# Patient Record
Sex: MALE | Race: WHITE | NOT HISPANIC OR LATINO | Employment: FULL TIME | URBAN - METROPOLITAN AREA
[De-identification: names, ages, dates, MRNs, and addresses within clinical notes are randomized per-mention and may not be internally consistent; named-entity substitution may affect disease eponyms.]

---

## 2017-01-05 ENCOUNTER — PATIENT OUTREACH (OUTPATIENT)
Dept: ADMINISTRATIVE | Facility: HOSPITAL | Age: 52
End: 2017-01-05

## 2017-01-19 ENCOUNTER — OFFICE VISIT (OUTPATIENT)
Dept: FAMILY MEDICINE | Facility: CLINIC | Age: 52
End: 2017-01-19
Payer: COMMERCIAL

## 2017-01-19 VITALS
RESPIRATION RATE: 17 BRPM | OXYGEN SATURATION: 94 % | BODY MASS INDEX: 38.09 KG/M2 | HEIGHT: 66 IN | SYSTOLIC BLOOD PRESSURE: 110 MMHG | HEART RATE: 84 BPM | WEIGHT: 237 LBS | DIASTOLIC BLOOD PRESSURE: 78 MMHG

## 2017-01-19 DIAGNOSIS — L21.9 SEBORRHEIC DERMATITIS: ICD-10-CM

## 2017-01-19 DIAGNOSIS — I10 ESSENTIAL HYPERTENSION: Primary | ICD-10-CM

## 2017-01-19 PROCEDURE — 3078F DIAST BP <80 MM HG: CPT | Mod: S$GLB,,, | Performed by: EMERGENCY MEDICINE

## 2017-01-19 PROCEDURE — 99999 PR PBB SHADOW E&M-EST. PATIENT-LVL III: CPT | Mod: PBBFAC,,, | Performed by: EMERGENCY MEDICINE

## 2017-01-19 PROCEDURE — 3074F SYST BP LT 130 MM HG: CPT | Mod: S$GLB,,, | Performed by: EMERGENCY MEDICINE

## 2017-01-19 PROCEDURE — 99213 OFFICE O/P EST LOW 20 MIN: CPT | Mod: S$GLB,,, | Performed by: EMERGENCY MEDICINE

## 2017-01-19 PROCEDURE — 1159F MED LIST DOCD IN RCRD: CPT | Mod: S$GLB,,, | Performed by: EMERGENCY MEDICINE

## 2017-01-19 NOTE — PROGRESS NOTES
"Subjective:   THIS NOTE IS DONE WITH VOICE RECOGNITION        Patient ID: Warren Garcia is a 51 y.o. male.    Chief Complaint: Hypertension      HPI     Warren continues to do quite well with his change in lifestyle, increased exercise, better diet, and reduction in his blood pressure medications.    Vitals - 1 value per visit 2/12/2016 4/18/2016 9/20/2016 1/19/2017   SYSTOLIC 132  120 110   DIASTOLIC 87  70 78   PULSE 85  79 84   TEMPERATURE       RESPIRATIONS   12 17   SPO2   97 94   Weight (lb) 275 273.37 245.15 236.99   HEIGHT 5' 6" 5' 6" 5' 6" 5' 6"   BODY MASS INDEX 44.41 44.14 39.57 38.25   VISIT REPORT       Pain Score  0  0 0     He is not experiencing any new issues.  The rate of weight loss has declined, which is expected.  He is reassured that if he continues his current behaviors, I would expect more weight to be lost.    No change in social history, surgical history, medical history, or family history.    Immunization History   Administered Date(s) Administered    Influenza Split 10/26/2010    Tdap 03/06/2015    influenza - Quadrivalent 01/22/2016       Current Outpatient Prescriptions   Medication Sig Dispense Refill    blood sugar diagnostic Strp 1 strip by Misc.(Non-Drug; Combo Route) route once daily. 100 strip 4    gemfibrozil (LOPID) 600 MG tablet TAKE ONE TABLET BY MOUTH TWICE DAILY 60 tablet 11    hydrochlorothiazide (HYDRODIURIL) 12.5 MG Tab TAKE ONE TABLET BY MOUTH ONCE DAILY 90 tablet 3    lancets (ONETOUCH DELICA LANCETS) 33 gauge Misc 1 lancet by Misc.(Non-Drug; Combo Route) route once daily. 100 each 3    lisinopril (PRINIVIL,ZESTRIL) 20 MG tablet TAKE ONE TABLET BY MOUTH ONCE DAILY 30 tablet 11    naproxen (NAPROSYN) 500 MG tablet Take 1 tablet (500 mg total) by mouth 2 (two) times daily with meals. 60 tablet 0     No current facility-administered medications for this visit.      Patient entered via patient portal and augmented by myself.      Review of Systems "   Constitutional: Negative for fatigue.   Eyes: Negative for visual disturbance (.).   Cardiovascular: Negative for chest pain.   Endocrine: Negative for polydipsia, polyphagia and polyuria.   Skin: Negative for pallor.   Neurological: Negative for dizziness, tremors, seizures, speech difficulty, weakness and headaches.   Psychiatric/Behavioral: Negative for confusion. The patient is not nervous/anxious.        Objective:      Physical Exam   Constitutional: He is oriented to person, place, and time. He appears well-nourished.   His physique continues to improve.   HENT:   Head: Normocephalic and atraumatic.   Eyes: Conjunctivae are normal. No scleral icterus.   Neck: Normal range of motion. Neck supple.   Cardiovascular: Normal rate, regular rhythm and normal heart sounds.    Pulmonary/Chest: Effort normal. He has no wheezes. He has no rales.   Abdominal: He exhibits no distension. There is no tenderness.   Musculoskeletal: He exhibits no edema.   Neurological: He is alert and oriented to person, place, and time.   Skin:   Residual seborrheic dermatitis in nasal labial folds, eyebrows, and around the ears.   Nursing note and vitals reviewed.      Assessment:       1. Essential hypertension    2. Seborrheic dermatitis        Plan:     1. Essential hypertension  Has been responsive to weight loss, he's encouraged to continue  Limit salt  Check labs at his convenience in the next couple of months.  Blood pressure target of less than 120 established, this being systolic.    - Lipid panel; Future  - Comprehensive metabolic panel; Future    2. Seborrheic dermatitis  Mild-to-moderate  We'll continue to try as needed therapy with over-the-counter hydrocortisone cream.  If this does not work, certainly we can add a prescription steroid cream.  Ideally nonfloranated.

## 2017-01-19 NOTE — MR AVS SNAPSHOT
[facility]  [address] [facility] [address]  [place] [address]  Phone: [phone]  Fax: [phone]                  Warren Quick [place]   2017 10:40 AM   Office Visit    Description:  Male : 1965   Provider:  Warren Simeon Jr., MD   Department:  [facility]           Reason for Visit     Hypertension           Diagnoses this Visit        Comments    Essential hypertension    -  Primary            To Do List           Goals (5 Years of Data)              Today    9/20/16    3/30/16    Blood Pressure < 140/90   110/78  120/70      LDL CHOLESTEROL < 130       89.2      [facility] On Call     [facility] On Call Nurse Care Line -  Assistance  Registered nurses in the [facility] On Call Center provide clinical advisement, health education, appointment booking, and other advisory services.  Call for this free service at [phone].             Medications           Message regarding Medications     Verify the changes and/or additions to your medication regime listed below are the same as discussed with your clinician today.  If any of these changes or additions are incorrect, please notify your healthcare provider.        STOP taking these medications     lisinopril (PRINIVIL,ZESTRIL) 20 MG tablet TAKE ONE TABLET BY MOUTH ONCE DAILY    gemfibrozil (LOPID) 600 MG tablet TAKE ONE TABLET BY MOUTH TWICE DAILY           Verify that the below list of medications is an accurate representation of the medications you are currently taking.  If none reported, the list may be blank. If incorrect, please contact your healthcare provider. Carry this list with you in case of emergency.           Current Medications     blood sugar diagnostic Strp 1 strip by Misc.(Non-Drug; Combo Route) route once daily.    hydrochlorothiazide (HYDRODIURIL) 12.5 MG Tab TAKE ONE TABLET BY MOUTH ONCE DAILY    lancets (ONETOUCH DELICA LANCETS) 33 gauge Misc 1 lancet by Misc.(Non-Drug; Combo Route) route once  "daily.    naproxen (NAPROSYN) 500 MG tablet Take 1 tablet (500 mg total) by mouth 2 (two) times daily with meals.           Clinical Reference Information           Vital Signs - Last Recorded  Most recent update: 1/19/2017 11:35 AM by Mony Quinn LPN    BP Pulse Resp Ht Wt SpO2    110/78 (BP Location: Right arm, Patient Position: Sitting, BP Method: Manual) 84 17 5' 6" (1.676 m) 107.5 kg (236 lb 15.9 oz) (!) 94%    BMI                38.25 kg/m2          Blood Pressure          Most Recent Value    BP  110/78      Allergies as of 1/19/2017     No Known Drug Allergies      Immunizations Administered on Date of Encounter - 1/19/2017     None      Orders Placed During Today's Visit     Future Labs/Procedures Expected by Expires    Comprehensive metabolic panel  1/19/2017 3/20/2018    Lipid panel  1/19/2017 3/20/2018      "

## 2017-01-20 DIAGNOSIS — E11.9 TYPE 2 DIABETES MELLITUS WITHOUT COMPLICATION: ICD-10-CM

## 2017-01-22 NOTE — PATIENT INSTRUCTIONS
Warren,    You're doing very well.  I would encourage you to continue the exercise and diet.    The seborrheic dermatitis is a chronic condition.  He manages with gentle soap and water, over-the-counter hydrocortisone cream (chordee).  If this fails, low-dose steroid cream can be prescribed.    We do need to update your labs at your convenience.  We can do this as we discussed at your visit.    LAURIE

## 2017-01-26 ENCOUNTER — PATIENT OUTREACH (OUTPATIENT)
Dept: ADMINISTRATIVE | Facility: HOSPITAL | Age: 52
End: 2017-01-26

## 2017-02-21 ENCOUNTER — PATIENT OUTREACH (OUTPATIENT)
Dept: ADMINISTRATIVE | Facility: HOSPITAL | Age: 52
End: 2017-02-21

## 2017-03-10 ENCOUNTER — PATIENT MESSAGE (OUTPATIENT)
Dept: FAMILY MEDICINE | Facility: CLINIC | Age: 52
End: 2017-03-10

## 2017-03-10 ENCOUNTER — TELEPHONE (OUTPATIENT)
Dept: FAMILY MEDICINE | Facility: CLINIC | Age: 52
End: 2017-03-10

## 2017-03-10 NOTE — TELEPHONE ENCOUNTER
----- Message from Gemini Roberts sent at 3/10/2017  9:09 AM CST -----  Contact: wife, heather   Stating he was in [place], heart attack   See in [place].   Instructed to see PCP within 2 weeks  Call back

## 2017-03-13 NOTE — TELEPHONE ENCOUNTER
See mychart message. Patient has referral for cardiology pended. Was at [facility] with heart attack. Needs cardiologist locally. He has an appointment with Dr Abreu on Tuesday 3/14/17. Patient also has an appointment with Dr Simeon on Friday 3/17/17. Please advise.

## 2017-03-14 ENCOUNTER — OFFICE VISIT (OUTPATIENT)
Dept: CARDIOLOGY | Facility: CLINIC | Age: 52
End: 2017-03-14
Payer: COMMERCIAL

## 2017-03-14 VITALS
DIASTOLIC BLOOD PRESSURE: 79 MMHG | BODY MASS INDEX: 38.09 KG/M2 | SYSTOLIC BLOOD PRESSURE: 112 MMHG | WEIGHT: 237 LBS | HEIGHT: 66 IN | HEART RATE: 69 BPM

## 2017-03-14 DIAGNOSIS — I10 ESSENTIAL HYPERTENSION: Primary | Chronic | ICD-10-CM

## 2017-03-14 DIAGNOSIS — I25.10 CORONARY ARTERY DISEASE INVOLVING NATIVE CORONARY ARTERY OF NATIVE HEART, ANGINA PRESENCE UNSPECIFIED: Primary | ICD-10-CM

## 2017-03-14 DIAGNOSIS — E78.1 HYPERTRIGLYCERIDEMIA: Chronic | ICD-10-CM

## 2017-03-14 DIAGNOSIS — I25.10 CORONARY ARTERY DISEASE INVOLVING NATIVE CORONARY ARTERY OF NATIVE HEART WITHOUT ANGINA PECTORIS: ICD-10-CM

## 2017-03-14 PROCEDURE — 1160F RVW MEDS BY RX/DR IN RCRD: CPT | Mod: S$GLB,,, | Performed by: INTERNAL MEDICINE

## 2017-03-14 PROCEDURE — 3074F SYST BP LT 130 MM HG: CPT | Mod: S$GLB,,, | Performed by: INTERNAL MEDICINE

## 2017-03-14 PROCEDURE — 99999 PR PBB SHADOW E&M-EST. PATIENT-LVL III: CPT | Mod: PBBFAC,,, | Performed by: INTERNAL MEDICINE

## 2017-03-14 PROCEDURE — 3078F DIAST BP <80 MM HG: CPT | Mod: S$GLB,,, | Performed by: INTERNAL MEDICINE

## 2017-03-14 PROCEDURE — 99204 OFFICE O/P NEW MOD 45 MIN: CPT | Mod: S$GLB,,, | Performed by: INTERNAL MEDICINE

## 2017-03-14 RX ORDER — ATORVASTATIN CALCIUM 80 MG/1
TABLET, FILM COATED ORAL
COMMUNITY
Start: 2017-03-09 | End: 2018-02-07 | Stop reason: SDUPTHER

## 2017-03-14 RX ORDER — METOPROLOL TARTRATE 25 MG/1
TABLET, FILM COATED ORAL
COMMUNITY
Start: 2017-03-09 | End: 2018-02-07 | Stop reason: SDUPTHER

## 2017-03-14 RX ORDER — LISINOPRIL 2.5 MG/1
2.5 TABLET ORAL DAILY
Qty: 90 TABLET | Refills: 3 | Status: SHIPPED | OUTPATIENT
Start: 2017-03-14 | End: 2017-05-04

## 2017-03-14 RX ORDER — LISINOPRIL 10 MG/1
TABLET ORAL
COMMUNITY
Start: 2017-03-09 | End: 2017-03-14 | Stop reason: DRUGHIGH

## 2017-03-14 RX ORDER — ASPIRIN 81 MG/1
TABLET ORAL
COMMUNITY

## 2017-03-14 NOTE — PROGRESS NOTES
Subjective:    Patient ID:  Warren Garcia is a 51 y.o. male who presents for follow-up of new pt (new pt); heart attack occured last week; and Hospital Follow Up      HPI51 yo WM with hx of NSTEMI last week in [place], [place] with stenting to the OM with non-obstructive disease in RCA and LAD.  EF by Echo 45%. Patient doing OK but appears to have anxiety about the recent event. Discussed cardiac rehab but travels frequently and cannot do formal rehab.  Review of Systems   Constitution: Positive for malaise/fatigue and night sweats. Negative for decreased appetite, fever, weakness, weight gain and weight loss.   HENT: Negative for headaches, hearing loss and nosebleeds.    Eyes: Negative for visual disturbance.   Cardiovascular: Negative for chest pain, claudication, cyanosis, dyspnea on exertion, irregular heartbeat, leg swelling, near-syncope, orthopnea, palpitations, paroxysmal nocturnal dyspnea and syncope.   Respiratory: Negative for cough, hemoptysis, shortness of breath, sleep disturbances due to breathing, snoring and wheezing.    Endocrine: Negative for cold intolerance, heat intolerance, polydipsia and polyuria.   Hematologic/Lymphatic: Negative for adenopathy and bleeding problem. Does not bruise/bleed easily.   Skin: Negative for color change, itching, poor wound healing, rash and suspicious lesions.   Musculoskeletal: Negative for arthritis, back pain, falls, joint pain, joint swelling, muscle cramps, muscle weakness and myalgias.   Gastrointestinal: Negative for bloating, abdominal pain, change in bowel habit, constipation, flatus, heartburn, hematemesis, hematochezia, hemorrhoids, jaundice, melena, nausea and vomiting.   Genitourinary: Negative for bladder incontinence, decreased libido, frequency, hematuria, hesitancy and urgency.   Neurological: Negative for brief paralysis, difficulty with concentration, excessive daytime sleepiness, dizziness, focal weakness, light-headedness, loss of  "balance, numbness and vertigo.   Psychiatric/Behavioral: Negative for altered mental status, depression and memory loss. The patient does not have insomnia and is not nervous/anxious.    Allergic/Immunologic: Negative for environmental allergies, hives and persistent infections.        Objective:    Physical Exam   Constitutional: He is oriented to person, place, and time. He appears well-developed and well-nourished. No distress.   /79  Pulse 69  Ht 5' 6" (1.676 m)  Wt 107.5 kg (236 lb 15.9 oz)  BMI 38.25 kg/m2     HENT:   Head: Normocephalic and atraumatic.   Eyes: Conjunctivae and lids are normal. Pupils are equal, round, and reactive to light. Right eye exhibits no discharge. No scleral icterus.   Neck: Normal range of motion. Neck supple. No JVD present. No tracheal deviation present. No thyromegaly present.   Cardiovascular: Normal rate, regular rhythm, S1 normal, S2 normal, normal heart sounds and intact distal pulses.  Exam reveals no gallop and no friction rub.    No murmur heard.  Pulses:       Carotid pulses are 2+ on the right side, and 2+ on the left side.       Radial pulses are 2+ on the right side, and 2+ on the left side.        Femoral pulses are 2+ on the right side, and 2+ on the left side.       Popliteal pulses are 2+ on the right side, and 2+ on the left side.        Dorsalis pedis pulses are 2+ on the right side, and 2+ on the left side.        Posterior tibial pulses are 2+ on the right side, and 2+ on the left side.   Pulmonary/Chest: Effort normal and breath sounds normal. No respiratory distress. He has no wheezes. He has no rales. He exhibits no tenderness.   Abdominal: Soft. Bowel sounds are normal. He exhibits no distension and no mass. There is no hepatosplenomegaly or hepatomegaly. There is no tenderness. There is no rebound and no guarding.   Musculoskeletal: Normal range of motion. He exhibits no edema or tenderness.   Lymphadenopathy:     He has no cervical adenopathy. "   Neurological: He is alert and oriented to person, place, and time. He has normal reflexes. No cranial nerve deficit. Coordination normal.   Skin: Skin is warm and dry. No rash noted. He is not diaphoretic. No erythema. No pallor.   Psychiatric: He has a normal mood and affect. His speech is normal and behavior is normal. Judgment and thought content normal. Cognition and memory are normal.         Assessment:       1. Essential hypertension    2. Hypertriglyceridemia    3. BMI 38.0-38.9,adult    4. Coronary artery disease involving native coronary artery of native heart without angina pectoris         Plan:    Will reduce lisinopril because of low BP readings at home  Continue other meds  Patient advised to modify risk factors such as weight, exercise, diet,  tobacco and alcohol exposure  No orders of the defined types were placed in this encounter.    Return in about 3 months (around 6/14/2017).

## 2017-03-14 NOTE — Clinical Note
Notified last night that he had a intervention while in [facility].  I do not have that data.  He apparently has an appointment to see you today for follow-up.  Disappointing, as he is spending enormous effort to reduce his weight and improve his health.  Thanks for your help.  LAURIE

## 2017-03-14 NOTE — LETTER
March 14, 2017      Warren Simeon Jr., MD  [address] [facility] [address] [place] [address]           [facility]  [address] [facility] [address] [place] [address]  Phone: [phone]          Patient: Warren Garcia   MR Number: [id number]   YOB: 1965   Date of Visit: 3/14/2017       Dear Dr. Warren Simeon Jr.:    Thank you for referring Warren Garcia to me for evaluation. Attached you will find relevant portions of my assessment and plan of care.    If you have questions, please do not hesitate to call me. I look forward to following Warren Garcia along with you.    Sincerely,    Jesse Abreu Jr., MD    Enclosure  CC:  No Recipients    If you would like to receive this communication electronically, please contact [email or web address] or [phone] to request more information on [facility] access.    For providers and/or their staff who would like to refer a patient to [facility], please contact us through our one-stop-shop provider referral line, [facility] , at [phone].    If you feel you have received this communication in error or would no longer like to receive these types of communications, please e-mail [email or web address]

## 2017-03-14 NOTE — PROGRESS NOTES
Notify patient had cardiac catheter with stent placement at [facility] in [place].  This happened a few days ago.  He has requested cardiology follow-up, referral has been submitted.    I do not have data around his cardiac catheterization.    LAURIE

## 2017-03-17 ENCOUNTER — OFFICE VISIT (OUTPATIENT)
Dept: FAMILY MEDICINE | Facility: CLINIC | Age: 52
End: 2017-03-17
Payer: COMMERCIAL

## 2017-03-17 ENCOUNTER — PATIENT MESSAGE (OUTPATIENT)
Dept: FAMILY MEDICINE | Facility: CLINIC | Age: 52
End: 2017-03-17

## 2017-03-17 VITALS
RESPIRATION RATE: 18 BRPM | SYSTOLIC BLOOD PRESSURE: 102 MMHG | HEIGHT: 66 IN | OXYGEN SATURATION: 97 % | DIASTOLIC BLOOD PRESSURE: 76 MMHG | HEART RATE: 68 BPM | BODY MASS INDEX: 37.91 KG/M2 | WEIGHT: 235.88 LBS

## 2017-03-17 DIAGNOSIS — I25.10 CORONARY ARTERY DISEASE INVOLVING NATIVE CORONARY ARTERY OF NATIVE HEART WITHOUT ANGINA PECTORIS: ICD-10-CM

## 2017-03-17 PROCEDURE — 99999 PR PBB SHADOW E&M-EST. PATIENT-LVL III: CPT | Mod: PBBFAC,,, | Performed by: EMERGENCY MEDICINE

## 2017-03-17 PROCEDURE — 3074F SYST BP LT 130 MM HG: CPT | Mod: S$GLB,,, | Performed by: EMERGENCY MEDICINE

## 2017-03-17 PROCEDURE — 3078F DIAST BP <80 MM HG: CPT | Mod: S$GLB,,, | Performed by: EMERGENCY MEDICINE

## 2017-03-17 PROCEDURE — 99213 OFFICE O/P EST LOW 20 MIN: CPT | Mod: S$GLB,,, | Performed by: EMERGENCY MEDICINE

## 2017-03-17 PROCEDURE — 1160F RVW MEDS BY RX/DR IN RCRD: CPT | Mod: S$GLB,,, | Performed by: EMERGENCY MEDICINE

## 2017-03-17 NOTE — MR AVS SNAPSHOT
[facility]  [address] [facility] [address] [place] [address]  Phone: [phone]  Fax: [phone]                  Warren Peralesd   3/17/2017 10:20 AM   Office Visit    Description:  Male : 1965   Provider:  Warren Simeon Jr., MD   Department:  [facility]           Reason for Visit     Hospital Follow Up                To Do List           Future Appointments        Provider Department Dept Phone    2017 3:20 PM Warren Simeon Jr., MD [facility] [phone]      Goals (5 Years of Data)              Today    3/14/17    1/19/17    Blood Pressure < 140/90   102/76  102/76  102/76    LDL CHOLESTEROL < 130              These Medications        Disp Refills Start End    ticagrelor (BRILINTA) 90 mg tablet 90 tablet 0 3/17/2017     Take 1 tablet (90 mg total) by mouth once daily. - Oral    Pharmacy: [facility] [address] [place] [address]. Ph #: [phone]         [facility] On Call     [facility] On Call Nurse Care Line -  Assistance  Registered nurses in the [facility] On Call Center provide clinical advisement, health education, appointment booking, and other advisory services.  Call for this free service at [phone].             Medications           Message regarding Medications     Verify the changes and/or additions to your medication regime listed below are the same as discussed with your clinician today.  If any of these changes or additions are incorrect, please notify your healthcare provider.        START taking these NEW medications        Refills    ticagrelor (BRILINTA) 90 mg tablet 0    Sig: Take 1 tablet (90 mg total) by mouth once daily.    Class: No Print    Route: Oral      STOP taking these medications     naproxen (NAPROSYN) 500 MG tablet Take 1 tablet (500 mg total) by mouth 2 (two) times daily with meals.           Verify that the below list of medications is an accurate representation of the  "medications you are currently taking.  If none reported, the list may be blank. If incorrect, please contact your healthcare provider. Carry this list with you in case of emergency.           Current Medications     aspirin (ECOTRIN) 81 MG EC tablet Take 81 mg by mouth daily    atorvastatin (LIPITOR) 80 MG tablet Take 1 tablet by mouth daily    blood sugar diagnostic Strp 1 strip by Misc.(Non-Drug; Combo Route) route once daily.    lancets (ONETOUCH DELICA LANCETS) 33 gauge Misc 1 lancet by Misc.(Non-Drug; Combo Route) route once daily.    lisinopril (PRINIVIL,ZESTRIL) 2.5 MG tablet Take 1 tablet (2.5 mg total) by mouth once daily.    metoprolol tartrate (LOPRESSOR) 25 MG tablet Take 1 tablet by mouth 2 (two) times daily    ticagrelor (BRILINTA) 90 mg tablet Take 1 tablet (90 mg total) by mouth once daily.           Clinical Reference Information           Your Vitals Were     BP Pulse Resp Height Weight SpO2    102/76 (BP Location: Right arm, Patient Position: Sitting, BP Method: Manual) 68 18 5' 6" (1.676 m) 107 kg (235 lb 14.3 oz) 97%    BMI                38.07 kg/m2          Blood Pressure          Most Recent Value    BP  102/76      Allergies as of 3/17/2017     Soma [Carisoprodol]      Immunizations Administered on Date of Encounter - 3/17/2017     None      Language Assistance Services     ATTENTION: Language assistance services are available, free of charge. Please call [phone].      ATENCIÓN: Si habla español, tiene a langford disposición servicios gratuitos de asistencia lingüística. Llame al [phone].     [facility] Ý: N?u b?n nói Ti?ng Vi?t, có các d?ch v? h? tr? ngôn ng? mi?n phí dành cho b?n. G?i s? [phone].         [facility] complies with applicable Federal civil rights laws and does not discriminate on the basis of race, color, national origin, age, disability, or sex.        "

## 2017-03-17 NOTE — PROGRESS NOTES
Subjective:   THIS NOTE IS DONE WITH VOICE RECOGNITION        Patient ID: Warren Garcia is a 51 y.o. male.    Chief Complaint: Hospital Follow Up      HPI     Warren had a heart attack while working in [employer] [facility].  He was hospitalized for a brief time, underwent catheter.  He had a lesion in the obtuse margin branch.  He underwent angioplasty as well as stent placement.  His post procedure echocardiogram revealed an ejection fracture of 45%.  Since leaving the hospital, he has not had any new chest pain.  He had right arm pain with some nausea as his presenting symptoms.    His sleep has been disrupted.  Occasionally he'll wake up feeling short of breath.  This is always just as a falling asleep.  This was present immediately following his catheter, and during his ICU stay, his oxygen saturations remained stable.    He's now home, he has been able to walk, at a reduced patient.  He is anxious to get back to work which does involve lifting boxes of 50-60 pounds.  I recommended he avoid doing this type of work at least until the end of April.  He'll get his son to help him.    He also mentions that he has begun to experience some recurring long finger pain, bilaterally, right greater than left.  He describes mild trigger finger symptoms.    He did see cardiology here.  He would like to continue to see cardiology here but would like to see a different cardiologist.    Current Outpatient Prescriptions   Medication Sig Dispense Refill    aspirin (ECOTRIN) 81 MG EC tablet Take 81 mg by mouth daily      atorvastatin (LIPITOR) 80 MG tablet Take 1 tablet by mouth daily      blood sugar diagnostic Strp 1 strip by Misc.(Non-Drug; Combo Route) route once daily. 100 strip 4    lancets (ONETOUCH DELICA LANCETS) 33 gauge Misc 1 lancet by Misc.(Non-Drug; Combo Route) route once daily. 100 each 3    lisinopril (PRINIVIL,ZESTRIL) 2.5 MG tablet Take 1 tablet (2.5 mg total) by mouth once daily. 90 tablet 3     metoprolol tartrate (LOPRESSOR) 25 MG tablet Take 1 tablet by mouth 2 (two) times daily      ticagrelor (BRILINTA) 90 mg tablet Take 1 tablet (90 mg total) by mouth once daily. 90 tablet 0     No current facility-administered medications for this visit.      Patient entered via patient portal and augmented by myself.    Review of Systems   Constitutional: Negative for activity change and unexpected weight change.   HENT: Negative for hearing loss, rhinorrhea and trouble swallowing.    Eyes: Negative for discharge and visual disturbance.   Respiratory: Positive for chest tightness and shortness of breath (when falling asleep, see above.). Negative for wheezing.    Cardiovascular: Negative for chest pain and palpitations.   Gastrointestinal: Negative for blood in stool, constipation, diarrhea and vomiting.   Endocrine: Negative for polydipsia and polyuria.   Genitourinary: Negative for difficulty urinating, hematuria and urgency.   Musculoskeletal: Negative for arthralgias and joint swelling.   Neurological: Negative for weakness and headaches.   Psychiatric/Behavioral: Negative for confusion and dysphoric mood.       Objective:      Physical Exam   Constitutional: He is oriented to person, place, and time. He appears well-nourished.   Nervous   HENT:   Mouth/Throat: Oropharynx is clear and moist.   Eyes: Conjunctivae are normal. No scleral icterus.   Neck: Neck supple. No JVD present. No thyromegaly present.   Cardiovascular: Normal rate, regular rhythm, normal heart sounds and intact distal pulses.    No murmur heard.  Access for his catheterization was right wrist.  This is well-healed.  There is some residual ecchymoses from blood drawing.   Pulmonary/Chest: He has no wheezes. He has no rales.   Abdominal: Soft. He exhibits no distension.   Musculoskeletal: He exhibits no edema or deformity.   Neurological: He is alert and oriented to person, place, and time.   Skin: No rash noted.   Psychiatric: He has a normal  mood and affect. His behavior is normal.   Vitals reviewed.      Assessment:       1. Coronary artery disease involving native coronary artery of native heart without angina pectoris        Plan:       1. Coronary artery disease involving native coronary artery of native heart without angina pectoris  Cardiac rehabilitation principles discussed  Continue gentle exercise  Avoid excessive lifting for at least 6 more weeks  Continue current medications  Reassurance concerning the shortness of breath as he falls asleep  Continue his efforts at weight loss.  All medications reviewed and reconciled.  The importance of keeping his aspirin antiplatelet inhibitor in place was discussed.

## 2017-03-17 NOTE — PATIENT INSTRUCTIONS
Warren,    I'm disappointed you had a heart attack, but I'm elated that you have done as well as as you did.    Please continue to gradually increase your exercise.    It's essential that you continue your aspirin and platelet inhibitor.  This is very important the first year following stent placement.    I will make a recommendation for a different cardiologist after I have a chance to talk with them.    LAURIE

## 2017-03-21 NOTE — TELEPHONE ENCOUNTER
Please print FMLA forms for signing when I return to the office.  Any help with filling out the forms is appreciated.    LAURIE

## 2017-03-24 DIAGNOSIS — E11.9 TYPE 2 DIABETES MELLITUS WITHOUT COMPLICATION: ICD-10-CM

## 2017-03-31 DIAGNOSIS — E11.9 TYPE 2 DIABETES MELLITUS WITHOUT COMPLICATION: ICD-10-CM

## 2017-04-03 ENCOUNTER — PATIENT MESSAGE (OUTPATIENT)
Dept: FAMILY MEDICINE | Facility: CLINIC | Age: 52
End: 2017-04-03

## 2017-04-17 DIAGNOSIS — M79.644 FINGER PAIN, RIGHT: Primary | ICD-10-CM

## 2017-04-18 ENCOUNTER — OFFICE VISIT (OUTPATIENT)
Dept: ORTHOPEDICS | Facility: CLINIC | Age: 52
End: 2017-04-18
Payer: COMMERCIAL

## 2017-04-18 ENCOUNTER — HOSPITAL ENCOUNTER (OUTPATIENT)
Dept: RADIOLOGY | Facility: HOSPITAL | Age: 52
Discharge: HOME | End: 2017-04-18
Attending: ORTHOPAEDIC SURGERY
Payer: COMMERCIAL

## 2017-04-18 VITALS
WEIGHT: 235 LBS | DIASTOLIC BLOOD PRESSURE: 82 MMHG | SYSTOLIC BLOOD PRESSURE: 117 MMHG | BODY MASS INDEX: 37.77 KG/M2 | HEART RATE: 78 BPM | HEIGHT: 66 IN

## 2017-04-18 DIAGNOSIS — M65.331 TRIGGER MIDDLE FINGER OF RIGHT HAND: Primary | ICD-10-CM

## 2017-04-18 DIAGNOSIS — M79.644 FINGER PAIN, RIGHT: ICD-10-CM

## 2017-04-18 PROCEDURE — 20550 NJX 1 TENDON SHEATH/LIGAMENT: CPT | Mod: F7,S$GLB,, | Performed by: ORTHOPAEDIC SURGERY

## 2017-04-18 PROCEDURE — 1160F RVW MEDS BY RX/DR IN RCRD: CPT | Mod: S$GLB,,, | Performed by: ORTHOPAEDIC SURGERY

## 2017-04-18 PROCEDURE — 3079F DIAST BP 80-89 MM HG: CPT | Mod: S$GLB,,, | Performed by: ORTHOPAEDIC SURGERY

## 2017-04-18 PROCEDURE — 99999 PR PBB SHADOW E&M-EST. PATIENT-LVL III: CPT | Mod: PBBFAC,,, | Performed by: ORTHOPAEDIC SURGERY

## 2017-04-18 PROCEDURE — 73140 X-RAY EXAM OF FINGER(S): CPT | Mod: 26,,, | Performed by: RADIOLOGY

## 2017-04-18 PROCEDURE — 99213 OFFICE O/P EST LOW 20 MIN: CPT | Mod: 25,S$GLB,, | Performed by: ORTHOPAEDIC SURGERY

## 2017-04-18 PROCEDURE — 3074F SYST BP LT 130 MM HG: CPT | Mod: S$GLB,,, | Performed by: ORTHOPAEDIC SURGERY

## 2017-04-18 RX ORDER — TRIAMCINOLONE ACETONIDE 40 MG/ML
40 INJECTION, SUSPENSION INTRA-ARTICULAR; INTRAMUSCULAR
Status: DISCONTINUED | OUTPATIENT
Start: 2017-04-18 | End: 2017-04-18 | Stop reason: HOSPADM

## 2017-04-18 RX ADMIN — TRIAMCINOLONE ACETONIDE 40 MG: 40 INJECTION, SUSPENSION INTRA-ARTICULAR; INTRAMUSCULAR at 01:04

## 2017-04-18 NOTE — PROCEDURES
Tendon Sheath  Date/Time: 4/18/2017 1:27 PM  Performed by: EDGARDO RED  Authorized by: EDGARDO RED     Consent Done?: Yes (Verbal)  Timeout: prior to procedure the correct patient, procedure, and site was verified   Indications:  Pain  Site marked: the procedure site was marked    Timeout: prior to procedure the correct patient, procedure, and site was verified    Location:  Long finger  Site:  R long MCP  Prep: patient was prepped and draped in usual sterile fashion    Ultrasonic guidance for needle placement?: No    Needle size:  25 G  Approach:  Volar  Medications:  40 mg triamcinolone acetonide 40 mg/mL  Patient tolerance:  Patient tolerated the procedure well with no immediate complications

## 2017-04-18 NOTE — PROGRESS NOTES
CC: right middle finger catching    SUBJECTIVE:  Warren Garcia is a 51 y.o. male who is RHD and began having right middle finger triggering. He is here for his injection today because he cannot take his NSAIDs as regularly as before.    Pain: 0-2/10      Past Medical History:   Diagnosis Date    Hyperlipidemia     Hypertension        Past Surgical History:   Procedure Laterality Date    HERNIA REPAIR      bilateral infant       Family History   Problem Relation Age of Onset    Cancer Father      skin    Stroke Mother     Hypertension Brother     Hypertension Brother     Hypertension Son          Current Outpatient Prescriptions:     aspirin (ECOTRIN) 81 MG EC tablet, Take 81 mg by mouth daily, Disp: , Rfl:     atorvastatin (LIPITOR) 80 MG tablet, Take 1 tablet by mouth daily, Disp: , Rfl:     lancets (ONETOUCH DELICA LANCETS) 33 gauge Misc, 1 lancet by Misc.(Non-Drug; Combo Route) route once daily., Disp: 100 each, Rfl: 3    lisinopril (PRINIVIL,ZESTRIL) 2.5 MG tablet, Take 1 tablet (2.5 mg total) by mouth once daily., Disp: 90 tablet, Rfl: 3    metoprolol tartrate (LOPRESSOR) 25 MG tablet, Take 1 tablet by mouth 2 (two) times daily, Disp: , Rfl:     ticagrelor (BRILINTA) 90 mg tablet, Take 1 tablet (90 mg total) by mouth once daily., Disp: 90 tablet, Rfl: 0    blood sugar diagnostic Strp, 1 strip by Misc.(Non-Drug; Combo Route) route once daily., Disp: 100 strip, Rfl: 4    Review of patient's allergies indicates:   Allergen Reactions    Soma [carisoprodol]        Vitals:    04/18/17 1258   BP: 117/82   Pulse: 78     Review of Systems   Respiratory:        Allergies   Cardiovascular:        HTN   Musculoskeletal: Positive for joint pain.   All other systems reviewed and are negative.        OBJECTIVE:  Vital signs as noted above.  Appearance: alert, well appearing, and in no distress, oriented to person, place, and time and overweight.  Right Hand exam: soft tissue tenderness and swelling at  the right middle finger volar MCP, reduced range of motion of right middle finger, radial pulse normal, sensation normal, palpable click with brian triggering on exam today.  X-ray: normal    ASSESSMENT:  Right middle finger stenosing tenosynovitis    PLAN:  Right middle finger trigger finger injection  F/U PRN  See orders for this visit as documented in the electronic medical record.

## 2017-04-19 ENCOUNTER — PATIENT MESSAGE (OUTPATIENT)
Dept: FAMILY MEDICINE | Facility: CLINIC | Age: 52
End: 2017-04-19

## 2017-04-21 DIAGNOSIS — E11.9 TYPE 2 DIABETES MELLITUS WITHOUT COMPLICATION: ICD-10-CM

## 2017-04-27 ENCOUNTER — PATIENT MESSAGE (OUTPATIENT)
Dept: FAMILY MEDICINE | Facility: CLINIC | Age: 52
End: 2017-04-27

## 2017-05-03 ENCOUNTER — PATIENT MESSAGE (OUTPATIENT)
Dept: FAMILY MEDICINE | Facility: CLINIC | Age: 52
End: 2017-05-03

## 2017-05-04 RX ORDER — LISINOPRIL 10 MG/1
15 TABLET ORAL DAILY
Qty: 45 TABLET | Refills: 1 | Status: SHIPPED | OUTPATIENT
Start: 2017-05-04 | End: 2017-06-19 | Stop reason: SDUPTHER

## 2017-05-04 NOTE — TELEPHONE ENCOUNTER
Bp reviewed  Have him continue lisinopril 10mg QAM and 5mg QHS  Updated erx sent  Needs BP f/u with Dr. Simeon or his NP in 2-4 weeks

## 2017-05-24 ENCOUNTER — PATIENT OUTREACH (OUTPATIENT)
Dept: ADMINISTRATIVE | Facility: HOSPITAL | Age: 52
End: 2017-05-24

## 2017-06-05 ENCOUNTER — PATIENT MESSAGE (OUTPATIENT)
Dept: ADMINISTRATIVE | Facility: HOSPITAL | Age: 52
End: 2017-06-05

## 2017-06-05 ENCOUNTER — PATIENT MESSAGE (OUTPATIENT)
Dept: FAMILY MEDICINE | Facility: CLINIC | Age: 52
End: 2017-06-05

## 2017-06-05 ENCOUNTER — PATIENT OUTREACH (OUTPATIENT)
Dept: ADMINISTRATIVE | Facility: HOSPITAL | Age: 52
End: 2017-06-05

## 2017-06-05 NOTE — TELEPHONE ENCOUNTER
Please advise patient is requesting to have echo done before his appointment with you on 6/19/17.

## 2017-06-19 ENCOUNTER — OFFICE VISIT (OUTPATIENT)
Dept: FAMILY MEDICINE | Facility: CLINIC | Age: 52
End: 2017-06-19
Payer: COMMERCIAL

## 2017-06-19 ENCOUNTER — TELEPHONE (OUTPATIENT)
Dept: FAMILY MEDICINE | Facility: CLINIC | Age: 52
End: 2017-06-19

## 2017-06-19 VITALS
WEIGHT: 238.56 LBS | HEART RATE: 68 BPM | SYSTOLIC BLOOD PRESSURE: 118 MMHG | BODY MASS INDEX: 38.34 KG/M2 | HEIGHT: 66 IN | DIASTOLIC BLOOD PRESSURE: 80 MMHG | OXYGEN SATURATION: 96 %

## 2017-06-19 DIAGNOSIS — I10 ESSENTIAL HYPERTENSION: Chronic | ICD-10-CM

## 2017-06-19 DIAGNOSIS — I25.10 CORONARY ARTERY DISEASE INVOLVING NATIVE CORONARY ARTERY OF NATIVE HEART WITHOUT ANGINA PECTORIS: Primary | Chronic | ICD-10-CM

## 2017-06-19 PROCEDURE — 99213 OFFICE O/P EST LOW 20 MIN: CPT | Mod: S$GLB,,, | Performed by: EMERGENCY MEDICINE

## 2017-06-19 PROCEDURE — 99999 PR PBB SHADOW E&M-EST. PATIENT-LVL III: CPT | Mod: PBBFAC,,, | Performed by: EMERGENCY MEDICINE

## 2017-06-19 RX ORDER — LISINOPRIL 10 MG/1
15 TABLET ORAL DAILY
Qty: 45 TABLET | Refills: 11 | Status: SHIPPED | OUTPATIENT
Start: 2017-06-19 | End: 2018-02-05 | Stop reason: DRUGHIGH

## 2017-06-19 NOTE — TELEPHONE ENCOUNTER
----- Message from Wendy Mckinley sent at 6/19/2017  8:44 AM CDT -----  Contact: Patient  Patient states that he is returning the call because a message was left to see if he can come in sooner.  Please call [phone].  Thank you

## 2017-06-25 NOTE — PATIENT INSTRUCTIONS
Warren,    Your course is doing as expected.  I would try to gradually increase your walking and exercise.    Your blood pressure control is been good.    In 3 months, I would like to repeat your blood work, and her echocardiogram.  Those orders have been entered.    If during the course of this recovery, you have chest pain, we need to know about it.    LAURIE

## 2017-06-25 NOTE — PROGRESS NOTES
Subjective:   THIS NOTE IS DONE WITH VOICE RECOGNITION        Patient ID: Warren Garcia is a 52 y.o. male.    Chief Complaint: Diabetes Mellitus      HPI     He did experience a myocardial infarction in early March 2017.  This was injection [place].  He was treated aggressively and has done well.  He did see cardiology within the week or 10 days following that event, no new issues were done and a 5.    Secondary to his work schedule, he has not been able to attend a formal rehabilitative process.  He is gradually improving his tolerance with his regular work.  He does note that it's less and it was prior to #artery infarction.    He is maintaining his aggressive dietary interventions, predominantly using a Weight [employer] type plan.  His weight has plateaued.    CAD stable.  Increasing exercise tolerance without angina.  Not 100% back to baseline.  In the last couple of weeks she's had some upper respiratory tract symptomatology.  He's had drainage in 6 stuffiness of the nose.  In the evening he has a barking type cough.  He's been using over-the-counter medications, avoiding decongestants.  He does seem to be getting better at this point.  Still with some nasal discharge.    While he was seen cardiology, his lisinopril was decreased.  His blood pressure remains well controlled.      Current Outpatient Prescriptions   Medication Sig Dispense Refill    aspirin (ECOTRIN) 81 MG EC tablet Take 81 mg by mouth daily      atorvastatin (LIPITOR) 80 MG tablet Take 1 tablet by mouth daily      blood sugar diagnostic Strp 1 strip by Misc.(Non-Drug; Combo Route) route once daily. 100 strip 4    lancets (ONETOUCH DELICA LANCETS) 33 gauge Misc 1 lancet by Misc.(Non-Drug; Combo Route) route once daily. 100 each 3    lisinopril 10 MG tablet Take 1.5 tablets (15 mg total) by mouth once daily. 45 tablet 1    metoprolol tartrate (LOPRESSOR) 25 MG tablet Take 1 tablet by mouth 2 (two) times daily      ticagrelor  (BRILINTA) 90 mg tablet Take 1 tablet (90 mg total) by mouth once daily. 90 tablet 0     No current facility-administered medications for this visit.          Review of Systems   Constitutional: Positive for fatigue. Negative for activity change and unexpected weight change.        His exercise tolerance is not returned to baseline following his infarct, but is improving.   HENT: Positive for rhinorrhea. Negative for hearing loss and trouble swallowing.    Eyes: Negative for discharge and visual disturbance.   Respiratory: Negative for chest tightness and wheezing.    Cardiovascular: Negative for chest pain, palpitations and leg swelling.   Gastrointestinal: Negative for blood in stool, constipation, diarrhea and vomiting.   Endocrine: Negative for polydipsia and polyuria.   Genitourinary: Negative for difficulty urinating, hematuria and urgency.   Musculoskeletal: Negative for arthralgias, joint swelling and neck pain.   Neurological: Negative for weakness and headaches.   Psychiatric/Behavioral: Negative for confusion and dysphoric mood.       Objective:      Physical Exam   Constitutional: He is oriented to person, place, and time. He appears well-developed and well-nourished.   HENT:   Head: Normocephalic.   Mouth/Throat: Oropharynx is clear and moist.   Eyes: Pupils are equal, round, and reactive to light.   Cardiovascular: Normal rate, regular rhythm, normal heart sounds and intact distal pulses.    No murmur heard.  Pulmonary/Chest: Effort normal and breath sounds normal. He has no wheezes. He has no rales.   Abdominal: Soft. Bowel sounds are normal. He exhibits no distension.   Musculoskeletal: He exhibits no edema.   Lymphadenopathy:     He has no cervical adenopathy.   Neurological: He is alert and oriented to person, place, and time.   Skin: Capillary refill takes less than 2 seconds.   Psychiatric: He has a normal mood and affect.   Vitals reviewed.      Assessment:       1. Coronary artery disease  involving native coronary artery of native heart without angina pectoris    2. Essential hypertension        Plan:       1. Coronary artery disease involving native coronary artery of native heart without angina pectoris  Stable  Continue current medications  Repeat echocardiogram in 3 months.  Gentle rehabilitation, cardiac, encouraged  Continue focus on weight loss.      2. Essential hypertension  Controlled  No change in medications.    His general course is been positive.  I would like to redo his echocardiogram in 3 months secondary him to a moderate decrease in ejection fracture was identified at the time of his infarct.  Routine blood work will be due in 3 months.

## 2017-08-25 ENCOUNTER — OFFICE VISIT (OUTPATIENT)
Dept: ORTHOPEDICS | Facility: CLINIC | Age: 52
End: 2017-08-25
Payer: COMMERCIAL

## 2017-08-25 ENCOUNTER — HOSPITAL ENCOUNTER (OUTPATIENT)
Dept: RADIOLOGY | Facility: HOSPITAL | Age: 52
Discharge: HOME | End: 2017-08-25
Attending: ORTHOPAEDIC SURGERY
Payer: COMMERCIAL

## 2017-08-25 VITALS
HEART RATE: 62 BPM | SYSTOLIC BLOOD PRESSURE: 139 MMHG | BODY MASS INDEX: 38.25 KG/M2 | DIASTOLIC BLOOD PRESSURE: 91 MMHG | HEIGHT: 66 IN | WEIGHT: 238 LBS

## 2017-08-25 DIAGNOSIS — M25.511 ACUTE PAIN OF RIGHT SHOULDER: ICD-10-CM

## 2017-08-25 DIAGNOSIS — M25.552 PAIN OF LEFT HIP JOINT: ICD-10-CM

## 2017-08-25 DIAGNOSIS — M25.511 ACUTE PAIN OF RIGHT SHOULDER: Primary | ICD-10-CM

## 2017-08-25 DIAGNOSIS — M19.011 ARTHRITIS OF RIGHT ACROMIOCLAVICULAR JOINT: ICD-10-CM

## 2017-08-25 DIAGNOSIS — M25.552 PAIN OF LEFT HIP JOINT: Primary | ICD-10-CM

## 2017-08-25 PROCEDURE — 99999 PR PBB SHADOW E&M-EST. PATIENT-LVL III: CPT | Mod: PBBFAC,,, | Performed by: ORTHOPAEDIC SURGERY

## 2017-08-25 PROCEDURE — 99213 OFFICE O/P EST LOW 20 MIN: CPT | Mod: S$GLB,,, | Performed by: ORTHOPAEDIC SURGERY

## 2017-08-25 PROCEDURE — 73030 X-RAY EXAM OF SHOULDER: CPT | Mod: 26,RT,, | Performed by: RADIOLOGY

## 2017-08-25 PROCEDURE — 3075F SYST BP GE 130 - 139MM HG: CPT | Mod: S$GLB,,, | Performed by: ORTHOPAEDIC SURGERY

## 2017-08-25 PROCEDURE — 3008F BODY MASS INDEX DOCD: CPT | Mod: S$GLB,,, | Performed by: ORTHOPAEDIC SURGERY

## 2017-08-25 PROCEDURE — 73521 X-RAY EXAM HIPS BI 2 VIEWS: CPT | Mod: TC,PO

## 2017-08-25 PROCEDURE — 3080F DIAST BP >= 90 MM HG: CPT | Mod: S$GLB,,, | Performed by: ORTHOPAEDIC SURGERY

## 2017-08-25 PROCEDURE — 73521 X-RAY EXAM HIPS BI 2 VIEWS: CPT | Mod: 26,,, | Performed by: RADIOLOGY

## 2017-08-25 PROCEDURE — 73030 X-RAY EXAM OF SHOULDER: CPT | Mod: TC,PO,RT

## 2017-08-25 NOTE — PROGRESS NOTES
Subjective:          Chief Complaint: Warren Garcia is a 52 y.o. male who had concerns including Pain of the Right Shoulder.    Mr. Garcia is here today for evaluation of his right shoulder. The pain began about 3 weeks ago without injury.    Pain: 5/10           Past Medical History:   Diagnosis Date    Hyperlipidemia     Hypertension        Past Surgical History:   Procedure Laterality Date    HERNIA REPAIR      bilateral infant       Family History   Problem Relation Age of Onset    Cancer Father      skin    Stroke Mother     Hypertension Brother     Hypertension Brother     Hypertension Son          Current Outpatient Prescriptions:     aspirin (ECOTRIN) 81 MG EC tablet, Take 81 mg by mouth daily, Disp: , Rfl:     atorvastatin (LIPITOR) 80 MG tablet, Take 1 tablet by mouth daily, Disp: , Rfl:     blood sugar diagnostic Strp, 1 strip by Misc.(Non-Drug; Combo Route) route once daily., Disp: 100 strip, Rfl: 4    lancets (ONETOUCH DELICA LANCETS) 33 gauge Misc, 1 lancet by Misc.(Non-Drug; Combo Route) route once daily., Disp: 100 each, Rfl: 3    lisinopril 10 MG tablet, Take 1.5 tablets (15 mg total) by mouth once daily., Disp: 45 tablet, Rfl: 11    metoprolol tartrate (LOPRESSOR) 25 MG tablet, Take 1 tablet by mouth 2 (two) times daily, Disp: , Rfl:     ticagrelor (BRILINTA) 90 mg tablet, Take 1 tablet (90 mg total) by mouth once daily., Disp: 90 tablet, Rfl: 0    Review of patient's allergies indicates:   Allergen Reactions    Soma [carisoprodol]        Vitals:    08/25/17 1235   BP: (!) 139/91   Pulse: 62         Review of Systems   Musculoskeletal: Positive for joint pain.                   Objective:        General: Warren Quick is well-developed, well-nourished, appears stated age, in no acute distress, alert and oriented to time, place and person.     General    Vitals reviewed.  Constitutional: He is oriented to person, place, and time. He appears well-developed and well-nourished. No  distress.   HENT:   Head: Normocephalic and atraumatic.   Nose: Nose normal.   Eyes: Pupils are equal, round, and reactive to light.   Cardiovascular: Normal rate.    Pulmonary/Chest: Effort normal.   Neurological: He is alert and oriented to person, place, and time.   Psychiatric: He has a normal mood and affect. His behavior is normal. Judgment and thought content normal.         Right Shoulder Exam     Inspection/Observation   Swelling: absent  Bruising: absent  Scars: absent  Deformity: absent  Scapular Winging: absent  Scapular Dyskinesia: negative  Atrophy: absent    Tenderness   The patient is tender to palpation of the acromioclavicular joint.    Range of Motion   Active Abduction: normal   Passive Abduction: normal   Forward Flexion: normal   Forward Elevation: normal  External Rotation 0 degrees: normal   External Rotation 90 degrees: normal  Internal Rotation 0 degrees: normal   Internal Rotation 90 degrees: normal     Tests & Signs   Cross Arm: positive  Drop Arm: negative  Hawkin's test: negative  Impingement: negative  Rotator Cuff Painful Arc/Range: mild    Other   Sensation: normal    Left Shoulder Exam   Left shoulder exam is normal.      Muscle Strength   Right Upper Extremity   Shoulder Abduction: 5/5   Shoulder Internal Rotation: 5/5   Shoulder External Rotation: 5/5   Supraspinatus: 5/5/5   Subscapularis: 5/5/5   Biceps: 5/5/5     Vascular Exam     Right Pulses      Radial:                    2+          Current and previous radiographic studies and results were reviewed with the patient:   4 views of the right shoulder were obtained.    No fracture, subluxation, or other significant abnormality is identified.  Joints and soft tissues are unremarkable.   Impression      No significant abnormality of the right shoulder.             Assessment:       Encounter Diagnoses   Name Primary?    Acute pain of right shoulder Yes    Arthritis of right acromioclavicular joint           Plan:         Will  plan for right AC joint injection  F/U 6 weeks after

## 2017-08-31 PROBLEM — M25.511 ACUTE PAIN OF RIGHT SHOULDER: Status: ACTIVE | Noted: 2017-08-31

## 2017-08-31 PROBLEM — M19.011 ARTHRITIS OF RIGHT ACROMIOCLAVICULAR JOINT: Status: ACTIVE | Noted: 2017-08-31

## 2017-09-11 ENCOUNTER — PATIENT MESSAGE (OUTPATIENT)
Dept: FAMILY MEDICINE | Facility: CLINIC | Age: 52
End: 2017-09-11

## 2017-09-12 ENCOUNTER — TELEPHONE (OUTPATIENT)
Dept: FAMILY MEDICINE | Facility: CLINIC | Age: 52
End: 2017-09-12

## 2017-09-12 NOTE — TELEPHONE ENCOUNTER
----- Message from Kirsty Castaneda sent at 9/12/2017  9:58 AM CDT -----  Contact: patient  Patient , Warren Garcia,  [phone] called regarding the missed call from the nurse to schedule his echocardiogram.   Please advise.  Thanks!

## 2017-09-13 ENCOUNTER — HOSPITAL ENCOUNTER (OUTPATIENT)
Dept: RADIOLOGY | Facility: HOSPITAL | Age: 52
Discharge: HOME | End: 2017-09-13
Attending: ORTHOPAEDIC SURGERY
Payer: COMMERCIAL

## 2017-09-13 DIAGNOSIS — M25.511 PAIN IN RIGHT ACROMIOCLAVICULAR JOINT: ICD-10-CM

## 2017-09-13 PROCEDURE — 20600 DRAIN/INJ JOINT/BURSA W/O US: CPT | Mod: RT,,, | Performed by: RADIOLOGY

## 2017-09-13 PROCEDURE — 77002 NEEDLE LOCALIZATION BY XRAY: CPT | Mod: 26,,, | Performed by: RADIOLOGY

## 2017-09-13 PROCEDURE — 20600 DRAIN/INJ JOINT/BURSA W/O US: CPT | Mod: PO

## 2017-09-13 PROCEDURE — 77002 NEEDLE LOCALIZATION BY XRAY: CPT | Mod: TC,PO

## 2017-09-14 ENCOUNTER — LAB VISIT (OUTPATIENT)
Dept: LAB | Facility: HOSPITAL | Age: 52
End: 2017-09-14
Attending: EMERGENCY MEDICINE
Payer: COMMERCIAL

## 2017-09-14 DIAGNOSIS — E11.9 TYPE 2 DIABETES MELLITUS WITHOUT COMPLICATION: ICD-10-CM

## 2017-09-14 LAB
CHOLEST SERPL-MCNC: 135 MG/DL
CHOLEST/HDLC SERPL: 2.9 {RATIO}
ESTIMATED AVG GLUCOSE: 97 MG/DL
HBA1C MFR BLD HPLC: 5 %
HDLC SERPL-MCNC: 47 MG/DL
HDLC SERPL: 34.8 %
LDLC SERPL CALC-MCNC: 58.4 MG/DL
NONHDLC SERPL-MCNC: 88 MG/DL
TRIGL SERPL-MCNC: 148 MG/DL

## 2017-09-14 PROCEDURE — 36415 COLL VENOUS BLD VENIPUNCTURE: CPT | Mod: PO

## 2017-09-14 PROCEDURE — 80061 LIPID PANEL: CPT

## 2017-09-14 PROCEDURE — 83036 HEMOGLOBIN GLYCOSYLATED A1C: CPT

## 2017-09-19 ENCOUNTER — OFFICE VISIT (OUTPATIENT)
Dept: FAMILY MEDICINE | Facility: CLINIC | Age: 52
End: 2017-09-19
Payer: COMMERCIAL

## 2017-09-19 VITALS
HEIGHT: 66 IN | SYSTOLIC BLOOD PRESSURE: 120 MMHG | DIASTOLIC BLOOD PRESSURE: 82 MMHG | WEIGHT: 234.13 LBS | BODY MASS INDEX: 37.63 KG/M2 | HEART RATE: 68 BPM

## 2017-09-19 DIAGNOSIS — I25.10 CORONARY ARTERY DISEASE INVOLVING NATIVE CORONARY ARTERY OF NATIVE HEART WITHOUT ANGINA PECTORIS: Primary | ICD-10-CM

## 2017-09-19 DIAGNOSIS — I10 ESSENTIAL HYPERTENSION: Chronic | ICD-10-CM

## 2017-09-19 PROCEDURE — 90471 IMMUNIZATION ADMIN: CPT | Mod: S$GLB,,, | Performed by: EMERGENCY MEDICINE

## 2017-09-19 PROCEDURE — 99999 PR PBB SHADOW E&M-EST. PATIENT-LVL III: CPT | Mod: PBBFAC,,, | Performed by: EMERGENCY MEDICINE

## 2017-09-19 PROCEDURE — 3008F BODY MASS INDEX DOCD: CPT | Mod: S$GLB,,, | Performed by: EMERGENCY MEDICINE

## 2017-09-19 PROCEDURE — 3074F SYST BP LT 130 MM HG: CPT | Mod: S$GLB,,, | Performed by: EMERGENCY MEDICINE

## 2017-09-19 PROCEDURE — 99213 OFFICE O/P EST LOW 20 MIN: CPT | Mod: 25,S$GLB,, | Performed by: EMERGENCY MEDICINE

## 2017-09-19 PROCEDURE — 90686 IIV4 VACC NO PRSV 0.5 ML IM: CPT | Mod: S$GLB,,, | Performed by: EMERGENCY MEDICINE

## 2017-09-19 PROCEDURE — 3079F DIAST BP 80-89 MM HG: CPT | Mod: S$GLB,,, | Performed by: EMERGENCY MEDICINE

## 2017-09-25 ENCOUNTER — CLINICAL SUPPORT (OUTPATIENT)
Dept: CARDIOLOGY | Facility: CLINIC | Age: 52
End: 2017-09-25
Payer: COMMERCIAL

## 2017-09-25 DIAGNOSIS — I25.10 CORONARY ARTERY DISEASE INVOLVING NATIVE CORONARY ARTERY OF NATIVE HEART WITHOUT ANGINA PECTORIS: Chronic | ICD-10-CM

## 2017-09-25 LAB
DIASTOLIC DYSFUNCTION: YES
MITRAL VALVE REGURGITATION: ABNORMAL
RETIRED EF AND QEF - SEE NOTES: 50 (ref 55–65)
TRICUSPID VALVE REGURGITATION: ABNORMAL

## 2017-09-25 PROCEDURE — 93306 TTE W/DOPPLER COMPLETE: CPT | Mod: S$GLB,,, | Performed by: INTERNAL MEDICINE

## 2017-09-26 ENCOUNTER — PATIENT MESSAGE (OUTPATIENT)
Dept: FAMILY MEDICINE | Facility: CLINIC | Age: 52
End: 2017-09-26

## 2017-09-26 DIAGNOSIS — R60.9 FLUID RETENTION: Primary | ICD-10-CM

## 2017-09-26 NOTE — PROGRESS NOTES
Subjective:   THIS NOTE IS DONE WITH VOICE RECOGNITION        Patient ID: Warren Garcia is a 52 y.o. male.    Chief Complaint: Follow-up (from heart attack )      HPI     Eryez in follow-up on his cardiovascular disease.  He continues to exercise regularly.  Most of this is walking.  He does feel that his tolerance is increasing, but not back to baseline.    He has not had additional chest discomfort or shortness of breath.    He is due for follow-up echocardiogram.    He has no signs or symptoms of progressing failure, new angina etc.    BP Readings from Last 3 Encounters:   09/19/17 120/82   08/25/17 (!) 139/91   06/19/17 118/80     Lab Results   Component Value Date    CHOL 135 09/14/2017    CHOL 149 03/30/2016    CHOL 156 03/09/2015     Lab Results   Component Value Date    HDL 47 09/14/2017    HDL 39 (L) 03/30/2016    HDL 40 03/09/2015     Lab Results   Component Value Date    LDLCALC 58.4 (L) 09/14/2017    LDLCALC 89.2 03/30/2016    LDLCALC 97.2 03/09/2015     Lab Results   Component Value Date    TRIG 148 09/14/2017    TRIG 104 03/30/2016    TRIG 94 03/09/2015     Lab Results   Component Value Date    CHOLHDL 34.8 09/14/2017    CHOLHDL 26.2 03/30/2016    CHOLHDL 25.6 03/09/2015       There is significant emotional stress in the family with potential job changes.  He and his wife are working hard to make difficult but important decisions.    He is taking all medications.    Current Outpatient Prescriptions   Medication Sig Dispense Refill    aspirin (ECOTRIN) 81 MG EC tablet Take 81 mg by mouth daily      atorvastatin (LIPITOR) 80 MG tablet Take 1 tablet by mouth daily      lisinopril 10 MG tablet Take 1.5 tablets (15 mg total) by mouth once daily. 45 tablet 11    metoprolol tartrate (LOPRESSOR) 25 MG tablet Take 1 tablet by mouth 2 (two) times daily      ticagrelor (BRILINTA) 90 mg tablet Take 1 tablet (90 mg total) by mouth once daily. 90 tablet 0     No current facility-administered  medications for this visit.      Immunization History   Administered Date(s) Administered    Influenza Split 10/26/2010    Tdap 03/06/2015    influenza - Quadrivalent 01/22/2016    influenza - Quadrivalent - PF (ADULT) 09/19/2017     Flu shot given today    Patient entered via patient portal and augmented by myself.    Review of Systems   Constitutional: Negative for activity change and unexpected weight change.   HENT: Negative for hearing loss, rhinorrhea and trouble swallowing.    Eyes: Negative for discharge and visual disturbance.   Respiratory: Negative for chest tightness and wheezing.    Cardiovascular: Negative for chest pain and palpitations.   Gastrointestinal: Negative for blood in stool, constipation, diarrhea and vomiting.   Endocrine: Positive for polyuria. Negative for polydipsia.   Genitourinary: Negative for difficulty urinating, hematuria and urgency.   Musculoskeletal: Positive for arthralgias. Negative for joint swelling and neck pain.   Neurological: Negative for weakness and headaches.   Psychiatric/Behavioral: Negative for confusion and dysphoric mood.       Objective:      Physical Exam   Constitutional: He is oriented to person, place, and time. He appears well-developed and well-nourished. No distress.   HENT:   Head: Normocephalic and atraumatic.   Right Ear: External ear normal.   Left Ear: External ear normal.   Nose: Nose normal.   Mouth/Throat: Oropharynx is clear and moist. No oropharyngeal exudate.   Eyes: Conjunctivae and EOM are normal. Pupils are equal, round, and reactive to light. No scleral icterus.   Neck: Normal range of motion. Neck supple. No thyromegaly present.   Cardiovascular: Normal rate, regular rhythm, S1 normal, S2 normal and normal heart sounds.  Exam reveals no gallop.    No murmur heard.  Pulses:       Carotid pulses are 1+ on the right side, and 1+ on the left side.       Radial pulses are 1+ on the right side, and 1+ on the left side.        Femoral pulses  are 1+ on the right side, and 1+ on the left side.       Popliteal pulses are 1+ on the right side, and 1+ on the left side.        Dorsalis pedis pulses are 1+ on the right side, and 1+ on the left side.        Posterior tibial pulses are 1+ on the right side, and 1+ on the left side.   Pulmonary/Chest: Effort normal and breath sounds normal. He has no wheezes. He has no rales.   Abdominal: Soft. Bowel sounds are normal. He exhibits no distension. There is no tenderness.   Musculoskeletal: Normal range of motion. He exhibits no edema or tenderness.   Lymphadenopathy:     He has no cervical adenopathy.   Neurological: He is alert and oriented to person, place, and time. He has normal reflexes. He exhibits normal muscle tone. Coordination normal.   Skin: Skin is warm and dry. No rash noted.   Psychiatric: He has a normal mood and affect. His behavior is normal.   Vitals reviewed.      Assessment:       1. Coronary artery disease involving native coronary artery of native heart without angina pectoris    2. Essential hypertension        Plan:       1. Coronary artery disease involving native coronary artery of native heart without angina pectoris  Stable  Update echocardiogram  Continue salt restriction, regular exercise, caloric reduction, NP) tension to any new symptomatology.    2. Essential hypertension  Controlled  No change in medications  To continue monitoring at home.    Recheck in 3-4 months.

## 2017-09-26 NOTE — PATIENT INSTRUCTIONS
Warren, I just release the results of your echocardiogram.  They look good.    I do not anticipate changing any medications with you    Please continue monitor your blood pressure.    I encourage you and your wife to continue these for a positive lifestyle changes that you have made.    LAURIE

## 2017-10-09 RX ORDER — FUROSEMIDE 20 MG/1
20 TABLET ORAL DAILY PRN
Qty: 60 TABLET | Refills: 11 | Status: SHIPPED | OUTPATIENT
Start: 2017-10-09 | End: 2019-04-22 | Stop reason: SDUPTHER

## 2018-01-26 ENCOUNTER — TELEPHONE (OUTPATIENT)
Dept: SPORTS MEDICINE | Facility: CLINIC | Age: 53
End: 2018-01-26

## 2018-02-02 ENCOUNTER — PATIENT MESSAGE (OUTPATIENT)
Dept: FAMILY MEDICINE | Facility: CLINIC | Age: 53
End: 2018-02-02

## 2018-02-05 ENCOUNTER — TELEPHONE (OUTPATIENT)
Dept: FAMILY MEDICINE | Facility: CLINIC | Age: 53
End: 2018-02-05

## 2018-02-05 ENCOUNTER — CLINICAL SUPPORT (OUTPATIENT)
Dept: FAMILY MEDICINE | Facility: CLINIC | Age: 53
End: 2018-02-05
Payer: COMMERCIAL

## 2018-02-05 VITALS — DIASTOLIC BLOOD PRESSURE: 80 MMHG | SYSTOLIC BLOOD PRESSURE: 120 MMHG

## 2018-02-05 DIAGNOSIS — I10 ESSENTIAL HYPERTENSION: Primary | ICD-10-CM

## 2018-02-05 DIAGNOSIS — Z01.30 BLOOD PRESSURE CHECK: Primary | ICD-10-CM

## 2018-02-05 PROCEDURE — 99499 UNLISTED E&M SERVICE: CPT | Mod: S$GLB,,, | Performed by: EMERGENCY MEDICINE

## 2018-02-05 PROCEDURE — 99999 PR PBB SHADOW E&M-EST. PATIENT-LVL I: CPT | Mod: PBBFAC,,,

## 2018-02-05 RX ORDER — LISINOPRIL 40 MG/1
40 TABLET ORAL DAILY
Qty: 30 TABLET | Refills: 3 | Status: SHIPPED | OUTPATIENT
Start: 2018-02-05 | End: 2018-07-06 | Stop reason: SDUPTHER

## 2018-02-05 NOTE — PROGRESS NOTES
Pt was seen in ED in [facility] for chest pain. Dr. Simeon came in to see pt.

## 2018-02-05 NOTE — TELEPHONE ENCOUNTER
I spoke with him.  He had an entire day in the hospital which included chest x-ray, series of blood work, EKG, cardiology consultation.    He ruled out from a cardiac perspective.  It was recommended that he address his blood pressure more aggressively by either increasing his lisinopril or adding another controlling agent.    His blood pressure log was reviewed.  For reasons that are unclear, he did have a sudden increase in blood pressure last couple of weeks.  He has been using variable doses of lisinopril.    I've recommended he go to 40 mg of lisinopril, continue to monitor his blood pressure.  A blood pressure target of 130/80 or less is established.  Script sent to [employer].      If his blood pressure is not controlled, the addition of amlodipine would be next step.

## 2018-02-05 NOTE — TELEPHONE ENCOUNTER
Pt bp 120/70 on nurse visit. Pt has been in the ED IN [place] yesterday for chest pain. Med change done. Pt states takes brilinta 90 mg two times a day and increased lisinopril to 40 mg daily.

## 2018-02-07 DIAGNOSIS — E78.2 MIXED HYPERLIPIDEMIA: ICD-10-CM

## 2018-02-07 DIAGNOSIS — I25.10 CORONARY ARTERY DISEASE WITHOUT ANGINA PECTORIS, UNSPECIFIED VESSEL OR LESION TYPE, UNSPECIFIED WHETHER NATIVE OR TRANSPLANTED HEART: Primary | ICD-10-CM

## 2018-02-07 RX ORDER — ATORVASTATIN CALCIUM 80 MG/1
80 TABLET, FILM COATED ORAL DAILY
Qty: 90 TABLET | Refills: 3 | Status: SHIPPED | OUTPATIENT
Start: 2018-02-07 | End: 2019-01-16 | Stop reason: SDUPTHER

## 2018-02-07 RX ORDER — METOPROLOL TARTRATE 25 MG/1
25 TABLET, FILM COATED ORAL 2 TIMES DAILY
Qty: 60 TABLET | Refills: 11 | Status: SHIPPED | OUTPATIENT
Start: 2018-02-07 | End: 2019-01-16 | Stop reason: SDUPTHER

## 2018-02-08 ENCOUNTER — OFFICE VISIT (OUTPATIENT)
Dept: ORTHOPEDICS | Facility: CLINIC | Age: 53
End: 2018-02-08
Payer: COMMERCIAL

## 2018-02-08 VITALS — WEIGHT: 234 LBS | HEIGHT: 66 IN | BODY MASS INDEX: 37.61 KG/M2

## 2018-02-08 DIAGNOSIS — M67.921 TENDINOPATHY OF RIGHT BICEPS TENDON: Primary | ICD-10-CM

## 2018-02-08 PROCEDURE — 99214 OFFICE O/P EST MOD 30 MIN: CPT | Mod: 25,S$GLB,, | Performed by: ORTHOPAEDIC SURGERY

## 2018-02-08 PROCEDURE — 3008F BODY MASS INDEX DOCD: CPT | Mod: S$GLB,,, | Performed by: ORTHOPAEDIC SURGERY

## 2018-02-08 PROCEDURE — 20611 DRAIN/INJ JOINT/BURSA W/US: CPT | Mod: RT,S$GLB,, | Performed by: ORTHOPAEDIC SURGERY

## 2018-02-08 PROCEDURE — 99999 PR PBB SHADOW E&M-EST. PATIENT-LVL III: CPT | Mod: PBBFAC,,, | Performed by: ORTHOPAEDIC SURGERY

## 2018-02-08 RX ORDER — TRIAMCINOLONE ACETONIDE 40 MG/ML
40 INJECTION, SUSPENSION INTRA-ARTICULAR; INTRAMUSCULAR
Status: DISCONTINUED | OUTPATIENT
Start: 2018-02-08 | End: 2018-02-08 | Stop reason: HOSPADM

## 2018-02-08 RX ADMIN — TRIAMCINOLONE ACETONIDE 40 MG: 40 INJECTION, SUSPENSION INTRA-ARTICULAR; INTRAMUSCULAR at 11:02

## 2018-02-08 NOTE — PROGRESS NOTES
DATE: 2/8/2018  PATIENT: Warren Garcia  REFERRING MD:   CHIEF COMPLAINT:   Chief Complaint   Patient presents with    Right Shoulder - Pain       HISTORY:  Warren Garcia is a 52 y.o. right hand dominant male  who presents for initial evaluation of right shoulder pain.  He states he saw Dr. Bedolla last year and underwent A fluoroscopically guided cortisone injection to his acromial clavicular joint.  He noted that provided some relief but not complete relief.  He still notes pain on the anterior aspect of the shoulder.  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  He notes pain with forward elevation and lifting above shoulder height.  He denies any brian weakness.  Denies any numbness or tingling.  Denies any previous shoulder injuries.  Pain is reported at 0/10 at rest but increases with activity.    PAST MEDICAL/SURGICAL HISTORY:  Past Medical History:   Diagnosis Date    Hyperlipidemia     Hypertension      Past Surgical History:   Procedure Laterality Date    HERNIA REPAIR      bilateral infant       Current Medications:   Current Outpatient Prescriptions:     aspirin (ECOTRIN) 81 MG EC tablet, Take 81 mg by mouth daily, Disp: , Rfl:     atorvastatin (LIPITOR) 80 MG tablet, Take 1 tablet (80 mg total) by mouth once daily., Disp: 90 tablet, Rfl: 3    furosemide (LASIX) 20 MG tablet, Take 1 tablet (20 mg total) by mouth daily as needed., Disp: 60 tablet, Rfl: 11    lisinopril (PRINIVIL,ZESTRIL) 40 MG tablet, Take 1 tablet (40 mg total) by mouth once daily., Disp: 30 tablet, Rfl: 3    metoprolol tartrate (LOPRESSOR) 25 MG tablet, Take 1 tablet (25 mg total) by mouth 2 (two) times daily., Disp: 60 tablet, Rfl: 11    ticagrelor (BRILINTA) 90 mg tablet, Take 1 tablet (90 mg total) by mouth once daily., Disp: 90 tablet, Rfl: 0    Family History: family history was reviewed and is noncontributory  Social History:  "  Social History     Social History    Marital status:      Spouse name: N/A    Number of children: 3    Years of education: N/A     Occupational History    self employed [employer]     Social History Main Topics    Smoking status: Never Smoker    Smokeless tobacco: Never Used    Alcohol use No    Drug use: No    Sexual activity: Yes     Partners: Female     Other Topics Concern    Not on file     Social History Narrative    Minimal exercise       ROS:  Constitution: Negative for chills, fever, and sweats. Negative for unexplained weight loss.  HENT: Negative for headaches and blurry vision.   Cardiovascular: Negative for chest pain, irregular heartbeat, leg swelling and palpitations.   Respiratory: Negative for cough and shortness of breath.   Gastrointestinal: Negative for abdominal pain, heartburn, nausea and vomiting.   Genitourinary: Negative for bladder incontinence and dysuria.   Musculoskeletal: Negative for systemic arthritis, joint swelling, muscle weakness and myalgias.   Neurological: Negative for numbness.   Psychiatric/Behavioral: Negative for depression.   Endocrine: Negative for polyuria.   Hematologic/Lymphatic: Negative for bleeding disorders.  Skin: Negative for poor wound healing.     PHYSICAL EXAM:  Ht 5' 6" (1.676 m)   Wt 106.1 kg (234 lb)   BMI 37.77 kg/m²   Warren Garcia is a well developed, well nourished male in no acute distress. Physical examination of the right shoulder evaluated the following:    Inspection, palpation and ROM of the cervical spine  Disc compression testing bilaterally  Inspection for swelling, ecchymosis, erythema, deformity and atrophy  Tenderness to palpation of the soft tissue and bony structures  Active and passive range of motion  Sensation of the shoulder and upper extremity  Motor strength in the deltoid, supraspinatus, internal rotators and external rotators  Impingement, apprehension, relocation and Speed's tests  Upper extremity " vascular exam (skin temp,color, capillary refill)  Inspection for pseudomotor signs    Remarkable findings included:  Full range of motion cervical spine.  Negative disc compression sign.  Examination of the right shoulder reveals normal contour.  Tenderness in the bicipital groove.  Minimal tenderness over the acromioclavicular joint.  Range of motion of the shoulders full to forward elevation, abduction, internal and external rotation.  Patient intact C5-T1.  Motor strength 5/5 in the supraspinatus and external rotators.  Negative impingement sign.  Negative crossover sign.  Positive speed test noted.          IMAGING:   X-rays of the right shoulder previously performed are reviewed.  No acute fractures or dislocations are seen.  Minimal degenerative changes at the acromioclavicular joint noted.  No changes at the glenohumeral joint identified     ASSESSMENT:   Bicipital tendinitis right shoulder    PLAN:  The nature of the diagnosis, using models and diagrams when appropriate, was explained to the patient and his wife in detail.  I have explained that my examination suggest bicipital tendinitis.  I recommended ultrasound guided cortisone injection (performed today, see no).  He'll monitor his symptoms over the next few weeks.  Should he continue to remain symptomatic consideration for MRI for further evaluation will be discussed.  Follow-up if not improving or worse.      This note was dictated using voice recognition software. Please excuse any grammatical or typographical errors.  Answers for HPI/ROS submitted by the patient on 2/5/2018   Arm pain  unexpected weight change: No  appetite change : No  sleep disturbance: No  IMMUNOCOMPROMISED: No  nervous/ anxious: No  dysphoric mood: No  rash: No  eye redness: No  cough: No  difficulty urinating: No  hematuria: No  chest pain: No  palpitations: No  vomiting: No  diarrhea: No  constipation: No  headaches: No  seizures: No  Pain Chronicity: recurrent  History of  trauma: No  Onset: more than 1 month ago  Frequency: intermittently  Progression since onset: waxing and waning  Injury mechanism: reaching  injury location: at home  pain- numeric: 4/10  pain location: right shoulder  pain quality: sharp  Radiating Pain: No  Aggravating factors: twisting  fever: No  inability to bear weight: No  itching: No  joint locking: No  limited range of motion: Yes  stiffness: No  tingling: No  Treatments tried: injection treatment, NSAIDs, OTC ointments, rest  physical therapy: not tried  Improvement on treatment: moderate

## 2018-02-08 NOTE — LETTER
February 8, 2018      Thai Bedolla MD  [address] [facility] [address] [place] [address]           [facility]  [address] [facility] [address] [place] [address]  Phone: [phone]          Patient: Warren Garcia   MR Number: [id number]   YOB: 1965   Date of Visit: 2/8/2018       Dear Dr. Thai Bedolla:    Thank you for referring Warren Garcia to me for evaluation. Attached you will find relevant portions of my assessment and plan of care.    If you have questions, please do not hesitate to call me. I look forward to following Warren Garcia along with you.    Sincerely,    Anthony Sheets MD    Enclosure  CC:  No Recipients    If you would like to receive this communication electronically, please contact [email or web address] or [phone] to request more information on [facility] access.    For providers and/or their staff who would like to refer a patient to [facility], please contact us through our one-stop-shop provider referral line, [facility], at [phone].    If you feel you have received this communication in error or would no longer like to receive these types of communications, please e-mail [email or web address]

## 2018-02-08 NOTE — PROCEDURES
Large Joint Aspiration/Injection  Date/Time: 2/8/2018 11:31 AM  Performed by: KYM NEELY  Authorized by: KYM NEELY     Consent Done?:  Yes (Verbal)  Timeout: Prior to procedure the correct patient, procedure, and site was verified      Location:  Shoulder (left bicipital groove)  Ultrasonic Guidance for needle placement: Yes  Images are saved and documented.  Needle size:  25 G  Approach:  Anterior  Medications:  40 mg triamcinolone acetonide 40 mg/mL  Patient tolerance:  Patient tolerated the procedure well with no immediate complications

## 2018-02-15 ENCOUNTER — PATIENT MESSAGE (OUTPATIENT)
Dept: FAMILY MEDICINE | Facility: CLINIC | Age: 53
End: 2018-02-15

## 2018-02-15 DIAGNOSIS — I10 ESSENTIAL HYPERTENSION: Primary | ICD-10-CM

## 2018-02-19 RX ORDER — AMLODIPINE BESYLATE 5 MG/1
5 TABLET ORAL DAILY
Qty: 30 TABLET | Refills: 11 | Status: SHIPPED | OUTPATIENT
Start: 2018-02-19 | End: 2019-01-16 | Stop reason: SDUPTHER

## 2018-03-02 DIAGNOSIS — Z12.11 COLON CANCER SCREENING: ICD-10-CM

## 2018-03-05 ENCOUNTER — PATIENT MESSAGE (OUTPATIENT)
Dept: FAMILY MEDICINE | Facility: CLINIC | Age: 53
End: 2018-03-05

## 2018-03-19 DIAGNOSIS — I25.10 CORONARY ARTERY DISEASE WITHOUT ANGINA PECTORIS, UNSPECIFIED VESSEL OR LESION TYPE, UNSPECIFIED WHETHER NATIVE OR TRANSPLANTED HEART: ICD-10-CM

## 2018-03-19 RX ORDER — TICAGRELOR 90 MG/1
TABLET ORAL
Qty: 60 TABLET | Refills: 11 | Status: SHIPPED | OUTPATIENT
Start: 2018-03-19 | End: 2019-02-16 | Stop reason: SDUPTHER

## 2018-03-26 ENCOUNTER — PATIENT MESSAGE (OUTPATIENT)
Dept: FAMILY MEDICINE | Facility: CLINIC | Age: 53
End: 2018-03-26

## 2018-03-26 ENCOUNTER — OFFICE VISIT (OUTPATIENT)
Dept: ORTHOPEDICS | Facility: CLINIC | Age: 53
End: 2018-03-26
Payer: COMMERCIAL

## 2018-03-26 VITALS — HEIGHT: 66 IN | WEIGHT: 234 LBS | BODY MASS INDEX: 37.61 KG/M2

## 2018-03-26 DIAGNOSIS — S46.211A RUPTURE OF RIGHT PROXIMAL BICEPS TENDON, INITIAL ENCOUNTER: Primary | ICD-10-CM

## 2018-03-26 PROCEDURE — 99214 OFFICE O/P EST MOD 30 MIN: CPT | Mod: S$GLB,,, | Performed by: ORTHOPAEDIC SURGERY

## 2018-03-26 PROCEDURE — 99999 PR PBB SHADOW E&M-EST. PATIENT-LVL II: CPT | Mod: PBBFAC,,, | Performed by: ORTHOPAEDIC SURGERY

## 2018-03-27 NOTE — PROGRESS NOTES
DATE: 3/26/2018  PATIENT: Warren Garcia  REFERRING MD:   CHIEF COMPLAINT:   Chief Complaint   Patient presents with    Arm Pain     right        HISTORY:  Warren Garcia is a 52 y.o. right hand dominant male  who presents for initial evaluation of a new injury to his right shoulder.  He was seen in February and diagnosed with bicipital tendinitis.  He underwent a cortisone injection to the bicipital groove which provided significant relief.  He reports that 2 weeks ago was moving boxes and felt some discomfort.  However 3 days later is lifting a 50 pound female and felt a pop shoulder.  He noted deformity.  He's had pain and burning after moving an empty trashcan.  He presents today for evaluation.  He states his pain is 0/10 at rest and 2-3 with position change.  He is employed selling text    PAST MEDICAL/SURGICAL HISTORY:  Past Medical History:   Diagnosis Date    Hyperlipidemia     Hypertension      Past Surgical History:   Procedure Laterality Date    HERNIA REPAIR      bilateral infant       Current Medications:   Current Outpatient Prescriptions:     amLODIPine (NORVASC) 5 MG tablet, Take 1 tablet (5 mg total) by mouth once daily., Disp: 30 tablet, Rfl: 11    aspirin (ECOTRIN) 81 MG EC tablet, Take 81 mg by mouth daily, Disp: , Rfl:     atorvastatin (LIPITOR) 80 MG tablet, Take 1 tablet (80 mg total) by mouth once daily., Disp: 90 tablet, Rfl: 3    BRILINTA 90 mg tablet, TAKE 1 TABLET BY MOUTH TWICE DAILY, Disp: 60 tablet, Rfl: 11    furosemide (LASIX) 20 MG tablet, Take 1 tablet (20 mg total) by mouth daily as needed., Disp: 60 tablet, Rfl: 11    lisinopril (PRINIVIL,ZESTRIL) 40 MG tablet, Take 1 tablet (40 mg total) by mouth once daily., Disp: 30 tablet, Rfl: 3    metoprolol tartrate (LOPRESSOR) 25 MG tablet, Take 1 tablet (25 mg total) by mouth 2 (two) times daily., Disp: 60 tablet, Rfl: 11    Family History: family history was reviewed and is noncontributory  Social History:   Social  "History     Social History    Marital status:      Spouse name: N/A    Number of children: 3    Years of education: N/A     Occupational History    self employed [employer]     Social History Main Topics    Smoking status: Never Smoker    Smokeless tobacco: Never Used    Alcohol use No    Drug use: No    Sexual activity: Yes     Partners: Female     Other Topics Concern    Not on file     Social History Narrative    Minimal exercise       ROS:  Constitution: Negative for chills, fever, and sweats. Negative for unexplained weight loss.  HENT: Negative for headaches and blurry vision.   Cardiovascular: Negative for chest pain, irregular heartbeat, leg swelling and palpitations.   Respiratory: Negative for cough and shortness of breath.   Gastrointestinal: Negative for abdominal pain, heartburn, nausea and vomiting.   Genitourinary: Negative for bladder incontinence and dysuria.   Musculoskeletal: Negative for systemic arthritis, joint swelling, muscle weakness and myalgias.   Neurological: Negative for numbness.   Psychiatric/Behavioral: Negative for depression.   Endocrine: Negative for polyuria.   Hematologic/Lymphatic: Negative for bleeding disorders.  Skin: Negative for poor wound healing.       PHYSICAL EXAM:  Ht 5' 6" (1.676 m)   Wt 106.1 kg (234 lb)   BMI 37.77 kg/m²   Warren Garcia is a well developed, well nourished male in no acute distress. Physical examination of the right shoulder evaluated the following:    Inspection, palpation and ROM of the cervical spine  Disc compression testing bilaterally  Inspection for swelling, ecchymosis, erythema, deformity and atrophy  Tenderness to palpation of the soft tissue and bony structures  Active and passive range of motion  Sensation of the shoulder and upper extremity  Motor strength in the deltoid, supraspinatus, internal rotators and external rotators  Impingement, apprehension, relocation and Speed's tests  Upper extremity vascular " exam (skin temp,color, capillary refill)  Inspection for pseudomotor signs    Remarkable findings included:  Obvious Jose muscle deformity.  Mild tenderness palpation in the bicipital groove.  Range of motion of the shoulder is 120° of forward elevation, 120° of abduction actively.  Passive range of motion is near full.  Motor strength 5/5 in supraspinatus and external rotators.  Mild impingement sign on exam.      IMAGING:   No new x-rays are performed today.     ASSESSMENT:   Proximal biceps rupture right shoulder (DOI approximately 3/12/18.    PLAN:  The nature of the diagnosis, using models and diagrams when appropriate, was explained to the patient and his wife in detail.  Treatment options discussed included observation, physical therapy, MRI to evaluate for concomitant rotator cuff tear.. All questions answered and the patient wishes to observe his symptoms at the present time.  He is requesting a home exercise program and instructed him to go we'll search rotator cuff strengthening and proximal biceps rupture protocols.  Should his symptoms persist or worsen, contact the office to schedule an MRI to rule out a retained biceps stump.  Follow-up if not improving or worse.      This note was dictated using voice recognition software. Please excuse any grammatical or typographical errors.

## 2018-03-29 DIAGNOSIS — E11.9 TYPE 2 DIABETES MELLITUS WITHOUT COMPLICATION: ICD-10-CM

## 2018-07-06 DIAGNOSIS — I10 ESSENTIAL HYPERTENSION: ICD-10-CM

## 2018-07-08 RX ORDER — LISINOPRIL 40 MG/1
TABLET ORAL
Qty: 30 TABLET | Refills: 11 | Status: SHIPPED | OUTPATIENT
Start: 2018-07-08 | End: 2019-07-05 | Stop reason: SDUPTHER

## 2018-08-20 ENCOUNTER — HOSPITAL ENCOUNTER (OUTPATIENT)
Dept: RADIOLOGY | Facility: HOSPITAL | Age: 53
Discharge: HOME | End: 2018-08-20
Attending: ORTHOPAEDIC SURGERY
Payer: COMMERCIAL

## 2018-08-20 ENCOUNTER — OFFICE VISIT (OUTPATIENT)
Dept: ORTHOPEDICS | Facility: CLINIC | Age: 53
End: 2018-08-20
Payer: COMMERCIAL

## 2018-08-20 VITALS — HEIGHT: 66 IN | WEIGHT: 234 LBS | BODY MASS INDEX: 37.61 KG/M2

## 2018-08-20 DIAGNOSIS — M54.5 ACUTE BILATERAL LOW BACK PAIN, WITH SCIATICA PRESENCE UNSPECIFIED: ICD-10-CM

## 2018-08-20 DIAGNOSIS — M79.641 RIGHT HAND PAIN: ICD-10-CM

## 2018-08-20 DIAGNOSIS — M79.641 RIGHT HAND PAIN: Primary | ICD-10-CM

## 2018-08-20 DIAGNOSIS — M25.551 RIGHT HIP PAIN: ICD-10-CM

## 2018-08-20 DIAGNOSIS — M65.331 TRIGGER MIDDLE FINGER OF RIGHT HAND: ICD-10-CM

## 2018-08-20 DIAGNOSIS — M70.61 TROCHANTERIC BURSITIS, RIGHT HIP: ICD-10-CM

## 2018-08-20 PROCEDURE — 73130 X-RAY EXAM OF HAND: CPT | Mod: 26,RT,, | Performed by: RADIOLOGY

## 2018-08-20 PROCEDURE — 99214 OFFICE O/P EST MOD 30 MIN: CPT | Mod: 25,S$GLB,, | Performed by: ORTHOPAEDIC SURGERY

## 2018-08-20 PROCEDURE — 20611 DRAIN/INJ JOINT/BURSA W/US: CPT | Mod: RT,S$GLB,, | Performed by: ORTHOPAEDIC SURGERY

## 2018-08-20 PROCEDURE — 72100 X-RAY EXAM L-S SPINE 2/3 VWS: CPT | Mod: TC,PO

## 2018-08-20 PROCEDURE — 72100 X-RAY EXAM L-S SPINE 2/3 VWS: CPT | Mod: 26,,, | Performed by: RADIOLOGY

## 2018-08-20 PROCEDURE — 73130 X-RAY EXAM OF HAND: CPT | Mod: TC,PO,RT

## 2018-08-20 PROCEDURE — 99999 PR PBB SHADOW E&M-EST. PATIENT-LVL III: CPT | Mod: PBBFAC,,, | Performed by: ORTHOPAEDIC SURGERY

## 2018-08-20 RX ORDER — TRIAMCINOLONE ACETONIDE 40 MG/ML
40 INJECTION, SUSPENSION INTRA-ARTICULAR; INTRAMUSCULAR
Status: DISCONTINUED | OUTPATIENT
Start: 2018-08-20 | End: 2018-08-20 | Stop reason: HOSPADM

## 2018-08-20 RX ADMIN — TRIAMCINOLONE ACETONIDE 40 MG: 40 INJECTION, SUSPENSION INTRA-ARTICULAR; INTRAMUSCULAR at 05:08

## 2018-08-21 NOTE — PROCEDURES
Large Joint Aspiration/Injection: R greater trochanteric bursa  Date/Time: 8/20/2018 5:00 PM  Performed by: Anthony Sheets MD  Authorized by: Anthony Sheets MD     Consent Done?:  Yes (Verbal)  Indications:  Pain  Procedure site marked: Yes    Timeout: Prior to procedure the correct patient, procedure, and site was verified      Location:  Hip  Site:  R greater trochanteric bursa  Prep: Patient was prepped and draped in usual sterile fashion    Ultrasonic Guidance for needle placement: Yes  Images are saved and documented.  Needle size:  22 G  Approach:  Lateral  Medications:  40 mg triamcinolone acetonide 40 mg/mL  Patient tolerance:  Patient tolerated the procedure well with no immediate complications

## 2018-08-21 NOTE — PROGRESS NOTES
DATE: 8/20/2018  PATIENT: Warren Garcia  REFERRING MD:  CHIEF COMPLAINT:   Chief Complaint   Patient presents with    Right Hand - Pain    Right Hip - Pain       HISTORY:  Warren Garcia is a 53 y.o. male  who presents for initial evaluation of a new problem regarding his right hip and f/u problem regarding his right long finger trigger finger. He reports 3-4 weeks of right lateral hip pain. No trauma. Denies back pain or radiating symptoms. Denies numbness or tingling to the right lower extremity. C/o recurrence of right long finger trigger. Reports that injection last year helped significantly. Pain is reported as 2/10 to the hip.      PAST MEDICAL/SURGICAL HISTORY:  Past Medical History:   Diagnosis Date    Hyperlipidemia     Hypertension      Past Surgical History:   Procedure Laterality Date    HERNIA REPAIR      bilateral infant       Current Medications:   Current Outpatient Medications:     amLODIPine (NORVASC) 5 MG tablet, Take 1 tablet (5 mg total) by mouth once daily., Disp: 30 tablet, Rfl: 11    aspirin (ECOTRIN) 81 MG EC tablet, Take 81 mg by mouth daily, Disp: , Rfl:     atorvastatin (LIPITOR) 80 MG tablet, Take 1 tablet (80 mg total) by mouth once daily., Disp: 90 tablet, Rfl: 3    BRILINTA 90 mg tablet, TAKE 1 TABLET BY MOUTH TWICE DAILY, Disp: 60 tablet, Rfl: 11    furosemide (LASIX) 20 MG tablet, Take 1 tablet (20 mg total) by mouth daily as needed., Disp: 60 tablet, Rfl: 11    lisinopril (PRINIVIL,ZESTRIL) 40 MG tablet, TAKE ONE TABLET BY MOUTH ONCE DAILY, Disp: 30 tablet, Rfl: 11    metoprolol tartrate (LOPRESSOR) 25 MG tablet, Take 1 tablet (25 mg total) by mouth 2 (two) times daily., Disp: 60 tablet, Rfl: 11    Family History: family history was reviewed and is noncontributory  Social History:   Social History     Socioeconomic History    Marital status:      Spouse name: Not on file    Number of children: 3    Years of education: Not on file    Highest  "education level: Not on file   Social Needs    Financial resource strain: Not on file    Food insecurity - worry: Not on file    Food insecurity - inability: Not on file    Transportation needs - medical: Not on file    Transportation needs - non-medical: Not on file   Occupational History    Occupation: self employed     Employer: [employer]   Tobacco Use    Smoking status: Never Smoker    Smokeless tobacco: Never Used   Substance and Sexual Activity    Alcohol use: No    Drug use: No    Sexual activity: Yes     Partners: Female   Other Topics Concern    Not on file   Social History Narrative    Minimal exercise       ROS:  Constitution: Negative for chills, fever, and sweats. Negative for unexplained weight loss.  HENT: Negative for headaches and blurry vision.   Cardiovascular: Negative for chest pain, irregular heartbeat, leg swelling and palpitations.   Respiratory: Negative for cough and shortness of breath.   Gastrointestinal: Negative for abdominal pain, heartburn, nausea and vomiting.   Genitourinary: Negative for bladder incontinence and dysuria.   Musculoskeletal: Negative for systemic arthritis, muscle weakness and myalgias.   Neurological: Negative for numbness.   Psychiatric/Behavioral: Negative for depression.  Endocrine: Negative for polyuria.   Hematologic/Lymphatic: Negative for bleeding disorders.   Skin: Negative for poor wound healing.        PHYSICAL EXAM:  Ht 5' 6" (1.676 m)   Wt 106.1 kg (234 lb)   BMI 37.77 kg/m²   Warren Garcia is a well developed, well nourished male in no acute distress. Physical examination of the right hip and lumbar spine evaluated the following:    Gait and Alignment  Lumbar spine range of motion  Inspection for ecchymosis, swelling, atrophy, or deformity  Tenderness to palpation over the bony and soft tissue structures around the lower back/hip  Inspection for intra-articular and/or bursal effusions  Range of Motion and presence of " contractures  Sensation and motor strength to the lower extremity  Pain/pop/click with logrolling or range of motion  Straight leg raise testing  Vascular exam to include skin temperature/color/capillary refill    Remarkable findings included:  ROM of the lumbar spine WNL. NVI to BLE. TTP over greater trochanter. Negative trendelenburg sign.    Right long finger TTP over A1 pulley. + catching. Sensation intact to finger.      IMAGING:   Previous xrays of right hip personally reviewed. Minimal degenerative changes. Xrays of LS spine personally reviewed. Old compression Fxs T12 and L1. Mild multilevel DDD also noted. Right hand xrays also personally reviewed. Mild DJD without acute processes.    ASSESSMENT:   Hamiltonter trochanteric bursitis right hip  Recurrent right long trigger finger    PLAN:  The nature of the diagnosis, using models and diagrams when appropriate, was explained to the patient and his wife in detail. I've recommended injection to the right trochanteric bursa (see procedure note) as this is more symptomatic than his finger. He'll montitor symptoms over the next 2-4 weeks and if still symptomatic will consider MRI to r/o gluteus medius tear. He may also f/u in 2-3 weeks for right long finger trigger finger injection should he still remain symptomatic.      This note was dictated using voice recognition software. Please excuse any grammatical or typographical errors.

## 2018-09-20 ENCOUNTER — LAB VISIT (OUTPATIENT)
Dept: LAB | Facility: HOSPITAL | Age: 53
End: 2018-09-20
Attending: EMERGENCY MEDICINE
Payer: COMMERCIAL

## 2018-09-20 ENCOUNTER — OFFICE VISIT (OUTPATIENT)
Dept: FAMILY MEDICINE | Facility: CLINIC | Age: 53
End: 2018-09-20
Payer: COMMERCIAL

## 2018-09-20 VITALS
DIASTOLIC BLOOD PRESSURE: 74 MMHG | HEART RATE: 70 BPM | OXYGEN SATURATION: 98 % | TEMPERATURE: 98 F | RESPIRATION RATE: 18 BRPM | SYSTOLIC BLOOD PRESSURE: 118 MMHG | WEIGHT: 223.13 LBS | BODY MASS INDEX: 35.86 KG/M2 | HEIGHT: 66 IN

## 2018-09-20 DIAGNOSIS — I10 ESSENTIAL HYPERTENSION: Primary | Chronic | ICD-10-CM

## 2018-09-20 DIAGNOSIS — Z11.59 ENCOUNTER FOR HEPATITIS C SCREENING TEST FOR LOW RISK PATIENT: ICD-10-CM

## 2018-09-20 DIAGNOSIS — E78.1 HYPERTRIGLYCERIDEMIA: Chronic | ICD-10-CM

## 2018-09-20 DIAGNOSIS — Z12.5 PROSTATE CANCER SCREENING: ICD-10-CM

## 2018-09-20 DIAGNOSIS — I10 ESSENTIAL HYPERTENSION: Chronic | ICD-10-CM

## 2018-09-20 DIAGNOSIS — Z12.11 COLON CANCER SCREENING: ICD-10-CM

## 2018-09-20 DIAGNOSIS — I25.10 CORONARY ARTERY DISEASE INVOLVING NATIVE CORONARY ARTERY OF NATIVE HEART WITHOUT ANGINA PECTORIS: Chronic | ICD-10-CM

## 2018-09-20 DIAGNOSIS — N40.0 BENIGN PROSTATIC HYPERPLASIA, UNSPECIFIED WHETHER LOWER URINARY TRACT SYMPTOMS PRESENT: ICD-10-CM

## 2018-09-20 LAB
ALBUMIN SERPL BCP-MCNC: 4.6 G/DL
ALP SERPL-CCNC: 71 U/L
ALT SERPL W/O P-5'-P-CCNC: 35 U/L
ANION GAP SERPL CALC-SCNC: 9 MMOL/L
AST SERPL-CCNC: 16 U/L
BILIRUB SERPL-MCNC: 1.4 MG/DL
BUN SERPL-MCNC: 20 MG/DL
CALCIUM SERPL-MCNC: 9.5 MG/DL
CHLORIDE SERPL-SCNC: 105 MMOL/L
CHOLEST SERPL-MCNC: 152 MG/DL
CHOLEST/HDLC SERPL: 2.5 {RATIO}
CO2 SERPL-SCNC: 26 MMOL/L
COMPLEXED PSA SERPL-MCNC: 0.45 NG/ML
CREAT SERPL-MCNC: 0.8 MG/DL
EST. GFR  (AFRICAN AMERICAN): >60 ML/MIN/1.73 M^2
EST. GFR  (NON AFRICAN AMERICAN): >60 ML/MIN/1.73 M^2
GLUCOSE SERPL-MCNC: 93 MG/DL
HDLC SERPL-MCNC: 60 MG/DL
HDLC SERPL: 39.5 %
LDLC SERPL CALC-MCNC: 69.2 MG/DL
NONHDLC SERPL-MCNC: 92 MG/DL
POTASSIUM SERPL-SCNC: 3.9 MMOL/L
PROT SERPL-MCNC: 7.5 G/DL
SODIUM SERPL-SCNC: 140 MMOL/L
TRIGL SERPL-MCNC: 114 MG/DL

## 2018-09-20 PROCEDURE — 90686 IIV4 VACC NO PRSV 0.5 ML IM: CPT | Mod: S$GLB,,, | Performed by: EMERGENCY MEDICINE

## 2018-09-20 PROCEDURE — 36415 COLL VENOUS BLD VENIPUNCTURE: CPT | Mod: PO

## 2018-09-20 PROCEDURE — 80061 LIPID PANEL: CPT

## 2018-09-20 PROCEDURE — 86803 HEPATITIS C AB TEST: CPT

## 2018-09-20 PROCEDURE — 80053 COMPREHEN METABOLIC PANEL: CPT

## 2018-09-20 PROCEDURE — 99999 PR PBB SHADOW E&M-EST. PATIENT-LVL IV: CPT | Mod: PBBFAC,,, | Performed by: EMERGENCY MEDICINE

## 2018-09-20 PROCEDURE — 99396 PREV VISIT EST AGE 40-64: CPT | Mod: 25,S$GLB,, | Performed by: EMERGENCY MEDICINE

## 2018-09-20 PROCEDURE — 90471 IMMUNIZATION ADMIN: CPT | Mod: S$GLB,,, | Performed by: EMERGENCY MEDICINE

## 2018-09-20 PROCEDURE — 84153 ASSAY OF PSA TOTAL: CPT

## 2018-09-20 NOTE — PROGRESS NOTES
Subjective:   THIS NOTE IS DONE WITH VOICE RECOGNITION        Patient ID: Warren Garcia is a 53 y.o. male.    Chief Complaint: Annual Exam      HPI      Warren is in today to review his preventive measures and talk about his ongoing medical issues.      Since I have seen him last, he has ruptured 1 of the heads of the right biceps.  He currently is pain-free.  There has not been a significant drop in his strength.  No new interventions are anticipated.  He ruptured the biceps while doing some heavy lifting.      His blood pressure control has been excellent.  He is taking his medications without problems.  He continues to focus on long-term weight reduction in the positive benefits thereof.    BP Readings from Last 3 Encounters:   09/20/18 118/74   02/05/18 120/80   09/19/17 120/82       He has not had any residual angina, rhythm disturbances or increasing shortness of breath.    He intentionally continues to lose weight.  He is doing this in a very focused and appropriate way.  The benefits in regards to his musculoskeletal complaints as well as his blood pressure obvious to him.  The entire family is working on weight control, and being successful.      Vitals - 1 value per visit 8/20/2018 9/20/2018   Weight (lb) 234 223.11       He is experiencing pain was nocturia times 3-4 per evening.  He has already tried limiting his  Fluid intake in the later afternoon evening.  He is not experiencing hematuria or other issues.    Hepatitis C screening discussed and antibody ordered.    No change in social history, surgical history, medical history, or family history.    Immunization History   Administered Date(s) Administered    Influenza - Quadrivalent 01/22/2016    Influenza - Quadrivalent - PF 09/19/2017, 09/20/2018    Influenza Split 10/26/2010    Tdap 03/06/2015     Flu vaccine done today.    Current Outpatient Medications   Medication Sig Dispense Refill    amLODIPine (NORVASC) 5 MG tablet Take 1 tablet  (5 mg total) by mouth once daily. 30 tablet 11    aspirin (ECOTRIN) 81 MG EC tablet Take 81 mg by mouth daily      atorvastatin (LIPITOR) 80 MG tablet Take 1 tablet (80 mg total) by mouth once daily. 90 tablet 3    BRILINTA 90 mg tablet TAKE 1 TABLET BY MOUTH TWICE DAILY 60 tablet 11    furosemide (LASIX) 20 MG tablet Take 1 tablet (20 mg total) by mouth daily as needed. 60 tablet 11    lisinopril (PRINIVIL,ZESTRIL) 40 MG tablet TAKE ONE TABLET BY MOUTH ONCE DAILY 30 tablet 11    metoprolol tartrate (LOPRESSOR) 25 MG tablet Take 1 tablet (25 mg total) by mouth 2 (two) times daily. 60 tablet 11     No current facility-administered medications for this visit.          Review of Systems   Constitutional: Negative for activity change and unexpected weight change.   HENT: Negative for hearing loss, rhinorrhea and trouble swallowing.    Eyes: Negative for discharge and visual disturbance.   Respiratory: Negative for chest tightness and wheezing.    Cardiovascular: Negative for chest pain and palpitations.   Gastrointestinal: Negative for blood in stool, constipation, diarrhea and vomiting.   Endocrine: Negative for polydipsia and polyuria.   Genitourinary: Negative for difficulty urinating, hematuria and urgency.   Musculoskeletal: Negative for arthralgias, joint swelling and neck pain.   Skin: Negative.    Neurological: Negative for weakness and headaches.   Psychiatric/Behavioral: Negative for confusion and dysphoric mood.       Objective:      Physical Exam   Constitutional: He is oriented to person, place, and time. He appears well-developed and well-nourished. No distress.   HENT:   Head: Normocephalic and atraumatic.   Right Ear: External ear normal.   Left Ear: External ear normal.   Mouth/Throat: Oropharynx is clear and moist. No oropharyngeal exudate.   Eyes: Conjunctivae and EOM are normal. Pupils are equal, round, and reactive to light. No scleral icterus.   Neck: Normal range of motion. Neck supple. No  JVD present. No thyromegaly present.   Cardiovascular: Normal rate, regular rhythm, normal heart sounds and intact distal pulses.   No murmur heard.  Pulmonary/Chest: Effort normal and breath sounds normal. He has no wheezes. He has no rales.   Abdominal: Soft. Bowel sounds are normal. He exhibits no distension and no mass. There is no tenderness.   Musculoskeletal: Normal range of motion. He exhibits no edema or tenderness.   Lymphadenopathy:     He has no cervical adenopathy.   Neurological: He is alert and oriented to person, place, and time. He has normal reflexes. He exhibits normal muscle tone. Coordination normal.   Skin: Skin is warm and dry. Capillary refill takes less than 2 seconds. No rash noted.   Psychiatric: He has a normal mood and affect. His behavior is normal.   Vitals reviewed.      Assessment:       1. Essential hypertension    2. Coronary artery disease involving native coronary artery of native heart without angina pectoris    3. Hypertriglyceridemia    4. Encounter for hepatitis C screening test for low risk patient    5. Prostate cancer screening    6. Colon cancer screening        Plan:       1. Essential hypertension   Controlled.  Continue current medications  Continue to avoid excessive sodium  Continue home monitoring  Target blood pressure is 139/89 or less  - Comprehensive metabolic panel; Future  - Lipid panel; Future  - Urinalysis; Future    2. Coronary artery disease involving native coronary artery of native heart without angina pectoris   no evidence of active disease  Continue current medications  Continue current exercise, which is walking almost on a daily basis.    3. Hypertriglyceridemia   controlled   Continued focus on diet and weight reduction    4. Encounter for hepatitis C screening test for low risk patient    Routine screening recommended  Low risk patient     - Hepatitis C antibody; Future    5. Prostate cancer screening  Symptoms consistent with BPH   Rule out other  issues  If his PSA is acceptable, will likely start alpha-blocker, low-dose.      - PSA, Screening; Future    6. Colon cancer screening  No mention of personal colon polyps   No recognized history of family with colon cancer  Options for using fecal immune testing versus colonoscopy discussed.  He would prefer to do the fecal immune testing.      - Fecal Immunochemical Test (iFOBT); Future

## 2018-09-21 ENCOUNTER — TELEPHONE (OUTPATIENT)
Dept: FAMILY MEDICINE | Facility: CLINIC | Age: 53
End: 2018-09-21

## 2018-09-21 LAB — HCV AB SERPL QL IA: NEGATIVE

## 2018-09-21 NOTE — TELEPHONE ENCOUNTER
[9/21/2018 3:07 PM]  Gemini Roberts:    in making post visit calls, bernadine jama, mrn [phone], results were normal.  Dr Simeon may put him on flomax,,  FYI   Dr Simeon may send med. in

## 2018-09-23 RX ORDER — TAMSULOSIN HYDROCHLORIDE 0.4 MG/1
0.4 CAPSULE ORAL DAILY
Qty: 30 CAPSULE | Refills: 11 | Status: SHIPPED | OUTPATIENT
Start: 2018-09-23 | End: 2019-08-10 | Stop reason: SDUPTHER

## 2018-09-23 NOTE — PATIENT INSTRUCTIONS
Warren,     Your blood work came back very normal.  This included your hepatitis screening, your PSA.      With the frequency of urination at the nighttime, I would recommend a trial of alpha blocker, low-dose.  This would be tamsulosin.  You would take this medication at bedtime.  The 1st dose can significantly dropped her blood pressure, so I recommend that you take this medicine as your sitting on the bed.  After you have had a few doses, most people become tolerant of this and it is no longer a problem.    If it does drop her blood pressure during the day time, we may need to adjust your blood pressure medications.    If this is not improving your frequency of urination at night within 1 month, let me know.      I have sent a prescription to your pharmacy.      Warren Simeon MD      Tamsulosin capsules  What is this medicine?  TAMSULOSIN (khan OSVALDO isaiah sin) is used to treat enlargement of the prostate gland in men, a condition called benign prostatic hyperplasia or BPH. It is not for use in women. It works by relaxing muscles in the prostate and bladder neck. This improves urine flow and reduces BPH symptoms.  How should I use this medicine?  Take this medicine by mouth about 30 minutes after the same meal every day. Follow the directions on the prescription label. Swallow the capsules whole with a glass of water. Do not crush, chew, or open capsules. Do not take your medicine more often than directed. Do not stop taking your medicine unless your doctor tells you to.  Talk to your pediatrician regarding the use of this medicine in children. Special care may be needed.  What side effects may I notice from receiving this medicine?  Side effects that you should report to your doctor or health care professional as soon as possible:  · allergic reactions like skin rash or itching, hives, swelling of the lips, mouth, tongue, or throat  · breathing problems  · change in vision  · feeling faint or  lightheaded  · irregular heartbeat  · prolonged or painful erection  · weakness  Side effects that usually do not require medical attention (report to your doctor or health care professional if they continue or are bothersome):  · back pain  · change in sex drive or performance  · constipation, nausea or vomiting  · cough  · drowsy  · runny or stuffy nose  · trouble sleeping  What may interact with this medicine?  · cimetidine  · fluoxetine  · ketoconazole  · medicines for erectile disfunction like sildenafil, tadalafil, vardenafil  · medicines for high blood pressure  · other alpha-blockers like alfuzosin, doxazosin, phentolamine, phenoxybenzamine, prazosin, terazosin  · warfarin  What if I miss a dose?  If you miss a dose, take it as soon as you can. If it is almost time for your next dose, take only that dose. Do not take double or extra doses. If you stop taking your medicine for several days or more, ask your doctor or health care professional what dose you should start back on.  Where should I keep my medicine?  Keep out of the reach of children.  Store at room temperature between 15 and 30 degrees C (59 and 86 degrees F). Throw away any unused medicine after the expiration date.  What should I tell my health care provider before I take this medicine?  They need to know if you have any of the following conditions:  · advanced kidney disease  · advanced liver disease  · low blood pressure  · prostate cancer  · an unusual or allergic reaction to tamsulosin, sulfa drugs, other medicines, foods, dyes, or preservatives  · pregnant or trying to get pregnant  · breast-feeding  What should I watch for while using this medicine?  Visit your doctor or health care professional for regular check ups. You will need lab work done before you start this medicine and regularly while you are taking it. Check your blood pressure as directed. Ask your health care professional what your blood pressure should be, and when you  should contact him or her.  This medicine may make you feel dizzy or lightheaded. This is more likely to happen after the first dose, after an increase in dose, or during hot weather or exercise. Drinking alcohol and taking some medicines can make this worse. Do not drive, use machinery, or do anything that needs mental alertness until you know how this medicine affects you. Do not sit or stand up quickly. If you begin to feel dizzy, sit down until you feel better. These effects can decrease once your body adjusts to the medicine.  Contact your doctor or health care professional right away if you have an erection that lasts longer than 4 hours or if it becomes painful. This may be a sign of a serious problem and must be treated right away to prevent permanent damage.  If you are thinking of having cataract surgery, tell your eye surgeon that you have taken this medicine.  NOTE:This sheet is a summary. It may not cover all possible information. If you have questions about this medicine, talk to your doctor, pharmacist, or health care provider. Copyright© 2017 [employer]

## 2018-09-27 ENCOUNTER — LAB VISIT (OUTPATIENT)
Dept: LAB | Facility: HOSPITAL | Age: 53
End: 2018-09-27
Attending: EMERGENCY MEDICINE
Payer: COMMERCIAL

## 2018-09-27 DIAGNOSIS — Z12.11 COLON CANCER SCREENING: ICD-10-CM

## 2018-09-27 LAB — HEMOCCULT STL QL IA: NEGATIVE

## 2018-09-27 PROCEDURE — 82274 ASSAY TEST FOR BLOOD FECAL: CPT

## 2018-10-08 ENCOUNTER — PATIENT MESSAGE (OUTPATIENT)
Dept: ADMINISTRATIVE | Facility: HOSPITAL | Age: 53
End: 2018-10-08

## 2018-11-19 ENCOUNTER — CLINICAL SUPPORT (OUTPATIENT)
Dept: AUDIOLOGY | Facility: CLINIC | Age: 53
End: 2018-11-19
Payer: COMMERCIAL

## 2018-11-19 ENCOUNTER — HOSPITAL ENCOUNTER (OUTPATIENT)
Dept: RADIOLOGY | Facility: HOSPITAL | Age: 53
Discharge: HOME | End: 2018-11-19
Attending: ORTHOPAEDIC SURGERY
Payer: COMMERCIAL

## 2018-11-19 ENCOUNTER — OFFICE VISIT (OUTPATIENT)
Dept: OTOLARYNGOLOGY | Facility: CLINIC | Age: 53
End: 2018-11-19
Payer: COMMERCIAL

## 2018-11-19 ENCOUNTER — OFFICE VISIT (OUTPATIENT)
Dept: ORTHOPEDICS | Facility: CLINIC | Age: 53
End: 2018-11-19
Payer: COMMERCIAL

## 2018-11-19 VITALS — WEIGHT: 223 LBS | HEIGHT: 66 IN | BODY MASS INDEX: 35.84 KG/M2

## 2018-11-19 VITALS — HEIGHT: 66 IN | WEIGHT: 223.13 LBS | BODY MASS INDEX: 35.86 KG/M2

## 2018-11-19 DIAGNOSIS — M25.551 RIGHT HIP PAIN: Primary | ICD-10-CM

## 2018-11-19 DIAGNOSIS — L29.9 ITCHING OF EAR: ICD-10-CM

## 2018-11-19 DIAGNOSIS — M25.551 RIGHT HIP PAIN: ICD-10-CM

## 2018-11-19 DIAGNOSIS — H90.3 SENSORINEURAL HEARING LOSS (SNHL), BILATERAL: ICD-10-CM

## 2018-11-19 DIAGNOSIS — M70.61 TROCHANTERIC BURSITIS, RIGHT HIP: ICD-10-CM

## 2018-11-19 DIAGNOSIS — H93.19 TINNITUS, UNSPECIFIED LATERALITY: Primary | ICD-10-CM

## 2018-11-19 DIAGNOSIS — H93.13 TINNITUS, BILATERAL: Primary | ICD-10-CM

## 2018-11-19 DIAGNOSIS — H90.3 BILATERAL HIGH FREQUENCY SENSORINEURAL HEARING LOSS: ICD-10-CM

## 2018-11-19 PROCEDURE — 99999 PR PBB SHADOW E&M-EST. PATIENT-LVL I: CPT | Mod: PBBFAC,,,

## 2018-11-19 PROCEDURE — 99999 PR PBB SHADOW E&M-EST. PATIENT-LVL III: CPT | Mod: PBBFAC,,, | Performed by: ORTHOPAEDIC SURGERY

## 2018-11-19 PROCEDURE — 99213 OFFICE O/P EST LOW 20 MIN: CPT | Mod: S$GLB,,, | Performed by: ORTHOPAEDIC SURGERY

## 2018-11-19 PROCEDURE — 73502 X-RAY EXAM HIP UNI 2-3 VIEWS: CPT | Mod: TC,PO,RT

## 2018-11-19 PROCEDURE — 92557 COMPREHENSIVE HEARING TEST: CPT | Mod: S$GLB,,, | Performed by: AUDIOLOGIST-HEARING AID FITTER

## 2018-11-19 PROCEDURE — 99999 PR PBB SHADOW E&M-EST. PATIENT-LVL III: CPT | Mod: PBBFAC,,, | Performed by: OTOLARYNGOLOGY

## 2018-11-19 PROCEDURE — 92567 TYMPANOMETRY: CPT | Mod: S$GLB,,, | Performed by: AUDIOLOGIST-HEARING AID FITTER

## 2018-11-19 PROCEDURE — 99203 OFFICE O/P NEW LOW 30 MIN: CPT | Mod: S$GLB,,, | Performed by: OTOLARYNGOLOGY

## 2018-11-19 PROCEDURE — 73502 X-RAY EXAM HIP UNI 2-3 VIEWS: CPT | Mod: 26,RT,, | Performed by: RADIOLOGY

## 2018-11-19 RX ORDER — BETAMETHASONE VALERATE 0.1 %
LOTION (ML) TOPICAL 2 TIMES DAILY
Qty: 60 ML | Refills: 1 | Status: SHIPPED | OUTPATIENT
Start: 2018-11-19 | End: 2019-08-22

## 2018-11-19 NOTE — PROGRESS NOTES
Warren Garcia was seen 11/19/2018 for an audiological evaluation. Patient complains of tinnitus AU that has worsened over the last month. Pt reports a strong Hx of noise exposure and a family Hx of hearing loss with his dad.     Results reveal a bilateral normal-to-moderate HF sensorineural hearing loss.    Speech Reception Thresholds were  5 dBHL for the right ear and 5 dBHL for the left ear.    Word recognition scores were good for the right ear and good for the left ear.   Tympanograms were Type A for the right ear and Type A for the left ear.    Audiogram results were reviewed in detail with patient and all questions were answered. Results will be reviewed by ENT at the completion of this note. Recommend trial of binaural amplification pending medical clearance, hearing test in one year due to noise exposure and hearing protection in loud noise. Pt took brochures for [facility] and will call the clinic if he wishes to set up a HA consult.

## 2018-11-19 NOTE — PROGRESS NOTES
Subjective:       Patient ID: Warren Garcia is a 53 y.o. male.    Chief Complaint: Tinnitus    Hearing Loss:    He describes a equally, bilateral sided tinnitus going on for some time but worsening over 1 month ago and more noticeable during quiet situations.  no pain, no otorrhea, no aural fullness.  Feels hearing issues in noise or social situations.   Also has intermittent right ear pruritis. Bothers him usually 1-2 times per week. Tried mineral oil for for 1 week without improvement.     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   He was exposed to drag on the weekends for a few years. Fam hx of HL in 40s.     MI 2 yrs ago.  DM II    Risk factors:  no Familial deafness   no Ototoxic medication exposure  yes Acoustic trauma  no Head injury or trauma  no Otologic infection   no History of meningitis  no Ear surgery     Review of Systems   Constitutional: Negative for activity change and appetite change.   Eyes: Negative for discharge.   Respiratory: Negative for difficulty breathing and wheezing   Cardiovascular: Negative for chest pain.   Gastrointestinal: Negative for abdominal distention and abdominal pain.   Endocrine: Negative for cold intolerance and heat intolerance.   Genitourinary: Negative for dysuria.   Musculoskeletal: Negative for gait problem and joint swelling.   Skin: Negative for color change and pallor.   Neurological: Negative for syncope and weakness.   Psychiatric/Behavioral: Negative for agitation and confusion.     Objective:        Constitutional:   He is oriented to person, place, and time. He appears well-developed and well-nourished. He appears alert. He is active. Normal speech.      Head:  Normocephalic and atraumatic. Head is without TMJ tenderness. No scars. Salivary glands normal.  Facial strength is normal.      Ears:    Right Ear: No drainage or swelling. No middle ear effusion.   Left Ear: No drainage or swelling.  No middle ear effusion.    No eczematous changes of EAC    Nose:  No mucosal edema, rhinorrhea or sinus tenderness. No turbinate hypertrophy.      Mouth/Throat  Oropharynx clear and moist without lesions or asymmetry, normal uvula midline and mirror exam normal. Normal dentition. No uvula swelling, lacerations or trismus. No oropharyngeal exudate. Tonsillar erythema, tonsillar exudate.      Neck:  Full range of motion with neck supple and no adenopathy. Thyroid tenderness is present. No tracheal deviation, no edema, no erythema, normal range of motion, no stridor, no crepitus and no neck rigidity present. No thyroid mass present.     Cardiovascular:   Intact distal pulses and normal pulses.      Pulmonary/Chest:   Effort normal and breath sounds normal. No stridor.     Psychiatric:   His speech is normal and behavior is normal. His mood appears not anxious. His affect is not labile.     Neurological:   He is alert and oriented to person, place, and time. No sensory deficit.     Skin:   No abrasions, lacerations, lesions, or rashes. No abrasion and no bruising noted.         Tests / Results:  I personally reviewed the audiogram and my findings reveal:   Normal thresholds with moderate noise notch at 3-4 kHz  %          Assessment:       1. Tinnitus, bilateral    2. Itching of ear    3. Sensorineural hearing loss (SNHL), bilateral          Plan:       Warren Garcia has noise induced hearing loss which is causing his tinnitus. Discussed measures to reduce noticing it, including Melatonin 6 mg nightly and consideration of noise machine at night. Discussed TRT as an option if he continues to have issues, but may adjust. HA candidate; however, hearing likely not severe enough yet to warrant.     Betamethasone for right ear itching. Also discussed continuing moisture therapy for the ear.

## 2018-11-19 NOTE — PROGRESS NOTES
"DATE: 11/19/2018  PATIENT: Warren Garcia    Attending Physician: Anthony Sheets M.D.    HISTORY:  Warren Garcia is a 53 y.o. male who returns for follow up evaluation of  his right hip.  He was diagnosed with greater trochanteric bursitis.  Underwent a cortisone injection on 08/20/2018.  States that helped mild to moderately.  His pain has returned.  He is also complaining of groin pain as well as pain in his lower back.  Pain is reported at 1-2/10 at rest but increases with movement.  He now presents for follow-up evaluation    PMH/PSH/FamHx/SocHx:  Reviewed and unchanged from prior visit    ROS:  Constitution: Negative for chills, fever, and sweats. Negative for unexplained weight loss.  HENT: Negative for headaches and blurry vision.   Cardiovascular: Negative for chest pain, irregular heartbeat, leg swelling and palpitations.   Respiratory: Negative for cough and shortness of breath.   Gastrointestinal: Negative for abdominal pain, heartburn, nausea and vomiting.   Genitourinary: Negative for bladder incontinence and dysuria.   Musculoskeletal: Negative for systemic arthritis, joint swelling, muscle weakness and myalgias.   Neurological: Negative for numbness.   Psychiatric/Behavioral: Negative for depression.   Endocrine: Negative for polyuria.   Hematologic/Lymphatic: Negative for bleeding disorders.  Skin: Negative for poor wound healing.       EXAM:  Ht 5' 6" (1.676 m)   Wt 101.2 kg (223 lb)   BMI 35.99 kg/m²   Warren Garcia is a well developed, well nourished male in no acute distress. Physical examination of the right hip and lumbar spine evaluated the following:    Gait and Alignment  Lumbar spine range of motion  Inspection for ecchymosis, swelling, atrophy, or deformity  Tenderness to palpation over the bony and soft tissue structures around the lower back/hip  Inspection for intra-articular and/or bursal effusions  Range of Motion and presence of contractures  Sensation and " motor strength to the lower extremity  Pain/pop/click with logrolling or range of motion  Straight leg raise testing  Vascular exam to include skin temperature/color/capillary refill    Remarkable findings included:  Range of motion lumbar spine is within normal limits.  Full range of motion of the right hip to flexion, abduction, internal-external rotation. Mild tenderness over the greater trochanter as well as the anterior hip. No pop or click elicited.  Sensation intact to the lower extremity            IMAGING:   No new x-rays are performed today.    ASSESSMENT:  Greater trochanteric bursitis versus labral tear hip.    PLAN:  The implications of the patient's evolution of symptoms and findings were explained to the patient in detail.Treatment option discussed included continued observation, repeat cortisone injection to the trochanteric bursa, ultrasound-guided injection to the right hip joint, referral to [facility] or MRI of the right hip.. All questions answered and the patient wishes to proceed with MRI to rule out a gluteus medius tear versus a labral tear. Follow-up after MRI has been performed discuss the results and further treatment recommendations.          This note was dictated using voice recognition software. Please excuse any grammatical or typographical errors.

## 2018-11-20 ENCOUNTER — HOSPITAL ENCOUNTER (OUTPATIENT)
Dept: RADIOLOGY | Facility: HOSPITAL | Age: 53
Discharge: HOME | End: 2018-11-20
Attending: ORTHOPAEDIC SURGERY
Payer: COMMERCIAL

## 2018-11-20 DIAGNOSIS — M25.551 RIGHT HIP PAIN: ICD-10-CM

## 2018-11-20 PROCEDURE — 73721 MRI JNT OF LWR EXTRE W/O DYE: CPT | Mod: TC,PO,RT

## 2018-11-20 PROCEDURE — 73721 MRI JNT OF LWR EXTRE W/O DYE: CPT | Mod: 26,RT,, | Performed by: RADIOLOGY

## 2018-11-26 ENCOUNTER — OFFICE VISIT (OUTPATIENT)
Dept: ORTHOPEDICS | Facility: CLINIC | Age: 53
End: 2018-11-26
Payer: COMMERCIAL

## 2018-11-26 VITALS — WEIGHT: 223 LBS | HEIGHT: 66 IN | BODY MASS INDEX: 35.84 KG/M2

## 2018-11-26 DIAGNOSIS — S73.191D TEAR OF RIGHT ACETABULAR LABRUM, SUBSEQUENT ENCOUNTER: Primary | ICD-10-CM

## 2018-11-26 PROCEDURE — 99214 OFFICE O/P EST MOD 30 MIN: CPT | Mod: 25,S$GLB,, | Performed by: ORTHOPAEDIC SURGERY

## 2018-11-26 PROCEDURE — 20611 DRAIN/INJ JOINT/BURSA W/US: CPT | Mod: RT,S$GLB,, | Performed by: ORTHOPAEDIC SURGERY

## 2018-11-26 PROCEDURE — 99999 PR PBB SHADOW E&M-EST. PATIENT-LVL III: CPT | Mod: PBBFAC,,, | Performed by: ORTHOPAEDIC SURGERY

## 2018-11-26 RX ORDER — TRIAMCINOLONE ACETONIDE 40 MG/ML
40 INJECTION, SUSPENSION INTRA-ARTICULAR; INTRAMUSCULAR
Status: DISCONTINUED | OUTPATIENT
Start: 2018-11-26 | End: 2018-11-26 | Stop reason: HOSPADM

## 2018-11-26 RX ADMIN — TRIAMCINOLONE ACETONIDE 40 MG: 40 INJECTION, SUSPENSION INTRA-ARTICULAR; INTRAMUSCULAR at 02:11

## 2018-11-26 NOTE — PROCEDURES
Large Joint Aspiration/Injection: R hip joint  Date/Time: 11/26/2018 2:30 PM  Performed by: Anthony Sheets MD  Authorized by: Anthony Sheets MD     Consent Done?:  Yes (Verbal)  Indications:  Pain  Timeout: Prior to procedure the correct patient, procedure, and site was verified      Location:  Hip  Site:  R hip joint  Prep: Patient was prepped and draped in usual sterile fashion    Ultrasonic Guidance for needle placement: Yes  Images are saved and documented.  Needle size:  22 G  Approach:  Anterior  Medications:  40 mg triamcinolone acetonide 40 mg/mL  Patient tolerance:  Patient tolerated the procedure well with no immediate complications

## 2018-11-26 NOTE — PROGRESS NOTES
"DATE: 11/26/2018  PATIENT: Warren Garcia    Attending Physician: Anthony Sheets M.D.    HISTORY:  Warren Garcia is a 53 y.o. male who returns for follow up evaluation of  his right hip.  He did undergo an MRI which showed a paralabral an acetabular cyst, mild osteoarthrosis and a labral tear.  He still notes pain which is rated at 2/10.  He presents discuss his MRI results and further treatment recommendations.    PMH/PSH/FamHx/SocHx:  Reviewed and unchanged from prior visit    ROS:  Constitution: Negative for chills, fever, and sweats. Negative for unexplained weight loss.  HENT: Negative for headaches and blurry vision.   Cardiovascular: Negative for chest pain, irregular heartbeat, leg swelling and palpitations.   Respiratory: Negative for cough and shortness of breath.   Gastrointestinal: Negative for abdominal pain, heartburn, nausea and vomiting.   Genitourinary: Negative for bladder incontinence and dysuria.   Musculoskeletal: Negative for systemic arthritis, joint swelling, muscle weakness and myalgias.   Neurological: Negative for numbness.   Psychiatric/Behavioral: Negative for depression.   Endocrine: Negative for polyuria.   Hematologic/Lymphatic: Negative for bleeding disorders.  Skin: Negative for poor wound healing.       EXAM:  Ht 5' 6" (1.676 m)   Wt 101.2 kg (223 lb)   BMI 35.99 kg/m²   Warren Garcia is a well developed, well nourished male in no acute distress. Physical examination of the right hip and lumbar spine evaluated the following:     Gait and Alignment  Lumbar spine range of motion  Inspection for ecchymosis, swelling, atrophy, or deformity  Tenderness to palpation over the bony and soft tissue structures around the lower back/hip  Inspection for intra-articular and/or bursal effusions  Range of Motion and presence of contractures  Sensation and motor strength to the lower extremity  Pain/pop/click with logrolling or range of motion  Straight leg raise " testing  Vascular exam to include skin temperature/color/capillary refill     Remarkable findings included:  Range of motion lumbar spine is within normal limits.  Full range of motion of the right hip to flexion, abduction, internal-external rotation. Mild tenderness over the greater trochanter as well as the anterior hip. No pop or click elicited.  Sensation intact to the lower extremity       IMAGING:   MRI is personally reviewed and consistent with the radiologist's report.  Labral tear, paralabral and acetabular cysts are noted.  Mild degenerative changes also seen    ASSESSMENT:  Labral tear and mild osteoarthrosis right hip    PLAN:  The implications of the patient's evolution of symptoms and findings were explained to the patient and his wife in detail.  Treatment options discussed included observation, continued conservative management, ultrasound-guided cortisone injection to the right hip and arthroscopy.. All questions answered and I have recommended ultrasound-guided cortisone injection for diagnostic and therapeutic purposes.  This was performed today (see procedure note).  He will monitor symptoms over the next 2 weeks and she continue to remain symptomatic he will return for consideration of arthroscopy.          This note was dictated using voice recognition software. Please excuse any grammatical or typographical errors.

## 2019-01-16 DIAGNOSIS — I25.10 CORONARY ARTERY DISEASE WITHOUT ANGINA PECTORIS, UNSPECIFIED VESSEL OR LESION TYPE, UNSPECIFIED WHETHER NATIVE OR TRANSPLANTED HEART: ICD-10-CM

## 2019-01-16 DIAGNOSIS — I10 ESSENTIAL HYPERTENSION: ICD-10-CM

## 2019-01-16 DIAGNOSIS — E78.2 MIXED HYPERLIPIDEMIA: ICD-10-CM

## 2019-01-17 RX ORDER — AMLODIPINE BESYLATE 5 MG/1
TABLET ORAL
Qty: 90 TABLET | Refills: 3 | Status: SHIPPED | OUTPATIENT
Start: 2019-01-17 | End: 2019-08-22 | Stop reason: SDUPTHER

## 2019-01-17 RX ORDER — METOPROLOL TARTRATE 25 MG/1
TABLET, FILM COATED ORAL
Qty: 180 TABLET | Refills: 3 | Status: SHIPPED | OUTPATIENT
Start: 2019-01-17 | End: 2019-08-22 | Stop reason: SDUPTHER

## 2019-01-17 RX ORDER — ATORVASTATIN CALCIUM 80 MG/1
TABLET, FILM COATED ORAL
Qty: 90 TABLET | Refills: 3 | Status: SHIPPED | OUTPATIENT
Start: 2019-01-17 | End: 2019-08-22 | Stop reason: SDUPTHER

## 2019-02-12 ENCOUNTER — OFFICE VISIT (OUTPATIENT)
Dept: ORTHOPEDICS | Facility: CLINIC | Age: 54
End: 2019-02-12
Payer: COMMERCIAL

## 2019-02-12 ENCOUNTER — HOSPITAL ENCOUNTER (OUTPATIENT)
Dept: RADIOLOGY | Facility: HOSPITAL | Age: 54
Discharge: HOME | End: 2019-02-12
Attending: NURSE PRACTITIONER
Payer: COMMERCIAL

## 2019-02-12 VITALS — WEIGHT: 223 LBS | HEIGHT: 66 IN | BODY MASS INDEX: 35.84 KG/M2

## 2019-02-12 DIAGNOSIS — M25.511 RIGHT SHOULDER PAIN, UNSPECIFIED CHRONICITY: ICD-10-CM

## 2019-02-12 DIAGNOSIS — M77.8 RIGHT SHOULDER TENDONITIS: Primary | ICD-10-CM

## 2019-02-12 PROCEDURE — 99999 PR PBB SHADOW E&M-EST. PATIENT-LVL III: ICD-10-PCS | Mod: PBBFAC,,, | Performed by: NURSE PRACTITIONER

## 2019-02-12 PROCEDURE — 73030 XR SHOULDER TRAUMA 3 VIEW RIGHT: ICD-10-PCS | Mod: 26,RT,, | Performed by: RADIOLOGY

## 2019-02-12 PROCEDURE — 99999 PR PBB SHADOW E&M-EST. PATIENT-LVL III: CPT | Mod: PBBFAC,,, | Performed by: NURSE PRACTITIONER

## 2019-02-12 PROCEDURE — 99214 PR OFFICE/OUTPT VISIT, EST, LEVL IV, 30-39 MIN: ICD-10-PCS | Mod: S$GLB,,, | Performed by: NURSE PRACTITIONER

## 2019-02-12 PROCEDURE — 73030 X-RAY EXAM OF SHOULDER: CPT | Mod: TC,PO,RT

## 2019-02-12 PROCEDURE — 99214 OFFICE O/P EST MOD 30 MIN: CPT | Mod: S$GLB,,, | Performed by: NURSE PRACTITIONER

## 2019-02-12 PROCEDURE — 73030 X-RAY EXAM OF SHOULDER: CPT | Mod: 26,RT,, | Performed by: RADIOLOGY

## 2019-02-12 NOTE — PROGRESS NOTES
DATE: 2/12/2019  PATIENT: Warren Garcia  REFERRING MD:   CHIEF COMPLAINT:   Chief Complaint   Patient presents with    Right Shoulder - Pain       HISTORY:  Warren Garcia is a 53 y.o. male  who presents for initial evaluation of his right shoulder pain, he is right hand dominant.  He was seen by Dr Sheets last year and an ultrasound guided cortisone injection to the right bicipital groove was performed.  His pain today is 4/10.  He has not been to physical therapy as he does not believe he has insurance coverage of PT.  He takes over the counter pain relief.  He is a  and travels a lot and has to carry 50 pound boxes.  His pain has progressively worsened over the last 2 months.    PAST MEDICAL/SURGICAL HISTORY:  Past Medical History:   Diagnosis Date    Hyperlipidemia     Hypertension      Past Surgical History:   Procedure Laterality Date    HERNIA REPAIR      bilateral infant       Current Medications:   Current Outpatient Medications:     amLODIPine (NORVASC) 5 MG tablet, TAKE 1 TABLET BY MOUTH ONCE DAILY, Disp: 90 tablet, Rfl: 3    aspirin (ECOTRIN) 81 MG EC tablet, Take 81 mg by mouth daily, Disp: , Rfl:     atorvastatin (LIPITOR) 80 MG tablet, TAKE 1 TABLET BY MOUTH ONCE DAILY, Disp: 90 tablet, Rfl: 3    betamethasone valerate 0.1% (VALISONE) 0.1 % Lotn, Apply topically 2 (two) times daily. for 14 days, Disp: 60 mL, Rfl: 1    BRILINTA 90 mg tablet, TAKE 1 TABLET BY MOUTH TWICE DAILY, Disp: 60 tablet, Rfl: 11    furosemide (LASIX) 20 MG tablet, Take 1 tablet (20 mg total) by mouth daily as needed., Disp: 60 tablet, Rfl: 11    lisinopril (PRINIVIL,ZESTRIL) 40 MG tablet, TAKE ONE TABLET BY MOUTH ONCE DAILY, Disp: 30 tablet, Rfl: 11    metoprolol tartrate (LOPRESSOR) 25 MG tablet, TAKE 1 TABLET BY MOUTH TWICE DAILY, Disp: 180 tablet, Rfl: 3    tamsulosin (FLOMAX) 0.4 mg Cap, Take 1 capsule (0.4 mg total) by mouth once daily., Disp: 30 capsule, Rfl: 11    Family History:  "family history was reviewed and is noncontributory  Social History:   Social History     Socioeconomic History    Marital status:      Spouse name: Not on file    Number of children: 3    Years of education: Not on file    Highest education level: Not on file   Social Needs    Financial resource strain: Not on file    Food insecurity - worry: Not on file    Food insecurity - inability: Not on file    Transportation needs - medical: Not on file    Transportation needs - non-medical: Not on file   Occupational History    Occupation: self employed     Employer: [employer]   Tobacco Use    Smoking status: Never Smoker    Smokeless tobacco: Never Used   Substance and Sexual Activity    Alcohol use: No    Drug use: No    Sexual activity: Yes     Partners: Female   Other Topics Concern    Not on file   Social History Narrative    Minimal exercise       ROS:  Constitution: Negative for chills, fever, and sweats. Negative for unexplained weight loss.  Eyes: no redness, no discharge  Ears: no ear pain or tinnitus  Cardiovascular: Negative for chest pain, irregular heartbeat, leg swelling and palpitations.   Respiratory: Negative for cough and shortness of breath.   Gastrointestinal: Negative for abdominal pain, nausea and vomiting.   Genitourinary: Negative for bladder incontinence and dysuria.   Neurological: Negative for numbness.   Psychiatric/Behavioral: Negative for behavior changes.   Endocrine: Negative for palpitations.   Hematologic/Lymphatic: Negative for bleeding disorders.  Skin: Negative for pruritis or rash.       PHYSICAL EXAM:  Ht 5' 6" (1.676 m)   Wt 101.2 kg (223 lb)   BMI 35.99 kg/m²   Warren Garcia is a well developed, well nourished male in no acute distress. Physical examination of the right shoulder evaluated the following:    Inspection, palpation and ROM of the cervical spine  Disc compression testing bilaterally  Inspection for swelling, ecchymosis, erythema, deformity " and atrophy  Tenderness to palpation of the soft tissue and bony structures  Active and passive range of motion  Sensation of the shoulder and upper extremity  Motor strength in the deltoid, supraspinatus, internal rotators and external rotators  Impingement, apprehension, relocation and Speed's tests  Upper extremity vascular exam (skin temp,color, capillary refill)  Inspection for pseudomotor signs    Remarkable findings included:  No edema, effusion, ecchymosis or obvious deformity  Nontender to palpation   ROM full, pain with abduction and external rotation  Strength 5/5  No gross instability  Sensation intact  Skin warm, dry, intact      IMAGING:   X-ray obtained Right shoulder performed today personally reviewed with patient. Radiologist report as follows:    No acute fracture, dislocation, or osseous destructive process appreciated radiographically.  No os acromiale.  There is right infraspinatus calcific tendinitis.  The right chest is clear.  There is degenerative change of the thoracic spine..    ASSESSMENT:   Right shoulder calcific tendonitis  History of right proximal bicep tendon rupture    PLAN:  The nature of the diagnosis, using models and diagrams when appropriate, was explained to the patient in detail. Treatment option discussed included non-operative measures of rest,  modification of activities, application of ice, over the counter pain/antiinflammatory relief, physica/occupational therapy, cortisone injection and medrol dose pack.  More aggressive treatment options include referal to Dr Taylor.  All questions answered and the patient wishes to proceed with scheduling a consultation with Dr Taylor.  Follow up if no improvement or worsening of symptoms.

## 2019-02-12 NOTE — LETTER
February 12, 2019      Warren Simeon Jr., MD  [address] [facility] [address] [place] [address]           [facility]  [address] [facility] [address] [place] [address]  Phone: [phone]          Patient: Warren Garcia   MR Number: [id number]   YOB: 1965   Date of Visit: 2/12/2019       Dear Dr. Warren Simeon Jr.:    Thank you for referring Warren Garcia to me for evaluation. Attached you will find relevant portions of my assessment and plan of care.    If you have questions, please do not hesitate to call me. I look forward to following Warren Garcia along with you.    Sincerely,    Aura Govea, APRN    Enclosure  CC:  No Recipients    If you would like to receive this communication electronically, please contact [email or web address] or [phone] to request more information on [facility] Link access.    For providers and/or their staff who would like to refer a patient to [facility], please contact us through our one-stop-shop provider referral line, [facility] , at [phone].    If you feel you have received this communication in error or would no longer like to receive these types of communications, please e-mail [email or web address]

## 2019-02-16 DIAGNOSIS — I25.10 CORONARY ARTERY DISEASE WITHOUT ANGINA PECTORIS, UNSPECIFIED VESSEL OR LESION TYPE, UNSPECIFIED WHETHER NATIVE OR TRANSPLANTED HEART: ICD-10-CM

## 2019-02-17 RX ORDER — TICAGRELOR 90 MG/1
TABLET ORAL
Qty: 180 TABLET | Refills: 3 | Status: SHIPPED | OUTPATIENT
Start: 2019-02-17 | End: 2019-08-22

## 2019-02-18 ENCOUNTER — INITIAL CONSULT (OUTPATIENT)
Dept: PHYSICAL MEDICINE AND REHAB | Facility: CLINIC | Age: 54
End: 2019-02-18
Payer: COMMERCIAL

## 2019-02-18 VITALS
WEIGHT: 223 LBS | DIASTOLIC BLOOD PRESSURE: 81 MMHG | HEIGHT: 66 IN | SYSTOLIC BLOOD PRESSURE: 125 MMHG | HEART RATE: 69 BPM | BODY MASS INDEX: 35.84 KG/M2

## 2019-02-18 DIAGNOSIS — M75.41 ROTATOR CUFF IMPINGEMENT SYNDROME OF RIGHT SHOULDER: ICD-10-CM

## 2019-02-18 DIAGNOSIS — M25.511 CHRONIC RIGHT SHOULDER PAIN: Primary | ICD-10-CM

## 2019-02-18 DIAGNOSIS — G89.29 CHRONIC RIGHT SHOULDER PAIN: Primary | ICD-10-CM

## 2019-02-18 PROCEDURE — 20611 DRAIN/INJ JOINT/BURSA W/US: CPT | Mod: RT,S$GLB,, | Performed by: PHYSICAL MEDICINE & REHABILITATION

## 2019-02-18 PROCEDURE — 99999 PR PBB SHADOW E&M-EST. PATIENT-LVL III: CPT | Mod: PBBFAC,,, | Performed by: PHYSICAL MEDICINE & REHABILITATION

## 2019-02-18 PROCEDURE — 20611 LARGE JOINT ASPIRATION/INJECTION: R SUBACROMIAL BURSA: ICD-10-PCS | Mod: RT,S$GLB,, | Performed by: PHYSICAL MEDICINE & REHABILITATION

## 2019-02-18 PROCEDURE — 99244 OFF/OP CNSLTJ NEW/EST MOD 40: CPT | Mod: 25,S$GLB,, | Performed by: PHYSICAL MEDICINE & REHABILITATION

## 2019-02-18 PROCEDURE — 76881 US COMPL JOINT R-T W/IMG: CPT | Mod: 59,S$GLB,, | Performed by: PHYSICAL MEDICINE & REHABILITATION

## 2019-02-18 PROCEDURE — 99244 PR OFFICE CONSULTATION,LEVEL IV: ICD-10-PCS | Mod: 25,S$GLB,, | Performed by: PHYSICAL MEDICINE & REHABILITATION

## 2019-02-18 PROCEDURE — 99999 PR PBB SHADOW E&M-EST. PATIENT-LVL III: ICD-10-PCS | Mod: PBBFAC,,, | Performed by: PHYSICAL MEDICINE & REHABILITATION

## 2019-02-18 PROCEDURE — 76881 PR US EXTREMITY OR AXILLA, TISSUE/MUSCLE/JOINT/NERVE; COMPLETE: ICD-10-PCS | Mod: 59,S$GLB,, | Performed by: PHYSICAL MEDICINE & REHABILITATION

## 2019-02-18 RX ORDER — BETAMETHASONE SODIUM PHOSPHATE AND BETAMETHASONE ACETATE 3; 3 MG/ML; MG/ML
6 INJECTION, SUSPENSION INTRA-ARTICULAR; INTRALESIONAL; INTRAMUSCULAR; SOFT TISSUE
Status: DISCONTINUED | OUTPATIENT
Start: 2019-02-18 | End: 2019-02-18 | Stop reason: HOSPADM

## 2019-02-18 RX ADMIN — BETAMETHASONE SODIUM PHOSPHATE AND BETAMETHASONE ACETATE 6 MG: 3; 3 INJECTION, SUSPENSION INTRA-ARTICULAR; INTRALESIONAL; INTRAMUSCULAR; SOFT TISSUE at 12:02

## 2019-02-18 NOTE — PROGRESS NOTES
[facility]    Consulting Provider: Aura Govea A*    CHIEF COMPLAINT:   Chief Complaint   Patient presents with    Shoulder Pain     right shoulder pain     HISTORY OF PRESENT ILLNESS: Warren Garcia is a 53 y.o. male who presents to me for 1st time for evaluation and treatment of right shoulder pain.  He has a history of chronic right shoulder pain.  He received an injection of corticosteroid to the right biceps tendon sheath last year which was beneficial, however he seems to have suffered a traumatic rupture of the proximal biceps tendon last year.  He feels improved but has been noticing increasing pain over recent months.  He denies any traumatic injury.  He rates his pain on average is a 2, however he may arise significantly with increased use.  He has not done any physical therapy secondary to insurance issues.  He locates the pain to the anterolateral aspect of the right shoulder.  He notes occasional popping of the area.  He denies any numbness or tingling of the right shoulder.  He has increased pain when lying on the right side in bed at night.    Review of Systems   Constitutional: Negative for fever.   HENT: Negative for drooling.    Eyes: Negative for discharge.   Respiratory: Negative for choking.    Cardiovascular: Negative for chest pain.   Genitourinary: Negative for flank pain.   Skin: Negative for wound.   Allergic/Immunologic: Negative for immunocompromised state.   Neurological: Negative for tremors and syncope.   Psychiatric/Behavioral: Negative for behavioral problems.     Past Medical History:   Past Medical History:   Diagnosis Date    Hyperlipidemia     Hypertension        Past Surgical History:   Past Surgical History:   Procedure Laterality Date    HERNIA REPAIR      bilateral infant       Family History:   Family History   Problem Relation Age of Onset    Cancer Father         skin    Stroke Mother     Hypertension Brother     Hypertension  Brother     Hypertension Son        Medications:   Current Outpatient Medications on File Prior to Visit   Medication Sig Dispense Refill    amLODIPine (NORVASC) 5 MG tablet TAKE 1 TABLET BY MOUTH ONCE DAILY 90 tablet 3    aspirin (ECOTRIN) 81 MG EC tablet Take 81 mg by mouth daily      atorvastatin (LIPITOR) 80 MG tablet TAKE 1 TABLET BY MOUTH ONCE DAILY 90 tablet 3    betamethasone valerate 0.1% (VALISONE) 0.1 % Lotn Apply topically 2 (two) times daily. for 14 days 60 mL 1    BRILINTA 90 mg tablet TAKE 1 TABLET BY MOUTH TWICE DAILY 180 tablet 3    furosemide (LASIX) 20 MG tablet Take 1 tablet (20 mg total) by mouth daily as needed. 60 tablet 11    lisinopril (PRINIVIL,ZESTRIL) 40 MG tablet TAKE ONE TABLET BY MOUTH ONCE DAILY 30 tablet 11    metoprolol tartrate (LOPRESSOR) 25 MG tablet TAKE 1 TABLET BY MOUTH TWICE DAILY 180 tablet 3    tamsulosin (FLOMAX) 0.4 mg Cap Take 1 capsule (0.4 mg total) by mouth once daily. 30 capsule 11     No current facility-administered medications on file prior to visit.        Allergies:   Review of patient's allergies indicates:   Allergen Reactions    Soma [carisoprodol]        Social History:   Social History     Socioeconomic History    Marital status:      Spouse name: None    Number of children: 3    Years of education: None    Highest education level: None   Social Needs    Financial resource strain: None    Food insecurity - worry: None    Food insecurity - inability: None    Transportation needs - medical: None    Transportation needs - non-medical: None   Occupational History    Occupation: self employed     Employer: [employer]   Tobacco Use    Smoking status: Never Smoker    Smokeless tobacco: Never Used   Substance and Sexual Activity    Alcohol use: No    Drug use: No    Sexual activity: Yes     Partners: Female   Other Topics Concern    None   Social History Narrative    Minimal exercise     PHYSICAL EXAMINATION:   General    Vitals:  "   02/18/19 1023   BP: 125/81   Pulse: 69   Weight: 101.2 kg (223 lb)   Height: 5' 6" (1.676 m)     Constitutional: Oriented to person, place, and time. No apparent distress. Appears well-developed and well-nourished. Pleasant.  HENT:   Head: Normocephalic and atraumatic.   Eyes: Right eye exhibits no discharge. Left eye exhibits no discharge. No scleral icterus.   Pulmonary/Chest: Effort normal. No respiratory distress.   Abdominal: There is no guarding.   Neurological: Alert and oriented to person, place, and time.   Psychiatric: Behavior is normal.   Right Shoulder Exam     Tenderness   The patient is experiencing tenderness in the biceps tendon (Tender over the greater tuberosity).    Range of Motion   Active abduction: 150   Passive abduction: 160   External rotation: 80   Forward flexion: 160   Internal rotation 90 degrees: 60     Muscle Strength   Right shoulder normal muscle strength: Complaints of pain with resisted movements about the right shoulder.  Abduction: 4/5   Internal rotation: 4/5   External rotation: 4/5   Biceps: 4/5     Tests   Valdes test: positive  Cross arm: negative  Impingement: positive  Drop arm: negative  Sulcus: absent    Other   Erythema: absent  Scars: absent  Sensation: normal  Pulse: present      Left Shoulder Exam     Tenderness   The patient is experiencing no tenderness.     Range of Motion   Active abduction: normal   Passive abduction: normal   External rotation: normal   Forward flexion: normal   Internal rotation 90 degrees: normal     Other   Erythema: absent  Scars: absent  Sensation: normal  Pulse: present         INSPECTION: There is no swelling, ecchymoses, erythema or gross deformity about the right shoulder.    Imaging  X-ray of the right shoulder from 02/12/2019: No acute fracture, dislocation, or osseous destructive process appreciated radiographically.  No os acromiale.  There is right infraspinatus calcific tendinitis.  The right chest is clear.  There is " degenerative change of the thoracic spine..    Diagnostic Ultrasound Exam:  FINDINGS: Real time examination was performed. Selected static and dynamic images were obtained, and correlated with prior x-ray and other available imaging.     The right shoulder was examined and findings were as follows: the long head of the biceps tendon was observed to have an attenuated appearance, and not clearly visualized within the bicipital groove.  This correlates with at least high-grade partial rupture.  There was a small amount of hypoechoic fluid surrounding the tendon. The biceps tendon did not reveal subluxation on dynamic imaging. There is no visible evidence for coracohumeral impingement. The acromioclavicular joint has some cortical irregularity. There is no evidence for abnormal translation of the acromioclavicular joint on cross-body adduction.     The rotator cuff was examined in detail. The subscapularis is heterogenous in echotexture consistent with tendinopathy.  There is also some cortical irregularity at the humeral attachment site consistent with extra chronic partial articular sided tearing.  No significant gaps in the tendon were visualized that would suggest significant tearing however.  The supraspinatus was abnormal also with a moderate degree of heterogenous echotexture consistent with tendinopathy. The infraspinatus was heterogenous in internal echotexture consistent with tendonopathy, along with an area of hyperechoic focus at the distal junction with the supraspinatus consistent with calcific tendinopathy. The teres minor was not assessed. The subacromial/subdeltoid bursa was thickened and inflated with a small degree of hypoechoic fluid.     There does not appear to be any effusion within the posterior glenohumeral recess. The posterosuperior glenoid labrum is normal. The spinoglenoid notch is normal, without a suprascapular ganglion cyst.    Data Reviewed: X-ray, ultrasound    Supportive Actions:  "Independent visualization of images or test specimens    ASSESSMENT:   1. Chronic right shoulder pain    2. Rotator cuff impingement syndrome of right shoulder      PLAN:     1. Time was spent reviewing the above diagnosis in depth with Warren Quick today, including acute management and rehabilitation.     2. Mr. Garcia has signs, symptoms, and diagnostic ultrasound exam findings are consistent with a diagnosis of right rotator cuff tendinopathy/impingement syndrome.  He does have rupture of the proximal right biceps tendon, however I believe his pain has large component of rotator cuff etiology.  I recommended conservative treatment in the form of injection therapy and physical therapy.  He reports that his insurance will currently not covered physical therapy, therefore we issued him a detailed rotator cuff conditioning home exercise program.  We did proceed today with ultrasound-guided injection of corticosteroid to the right subacromial bursa in hopes of getting him some immediate and long-term relief.  See separate procedure note.    3. RTC in 4-6 weeks if not improved.  He was encouraged to contact my office if any questions or issues arise in the meantime.    This is a consult from Aura Govea. Please see the "Communications" section of Epic to see how the consulting physician received the report of today's findings and recommendations. If it's an [facility] physician, it will be forwarded to his/her "in basket".    The above note was completed, in part, with the aid of Dragon dictation software/hardware. Translation errors may be present.    "

## 2019-02-18 NOTE — PROCEDURES
Large Joint Aspiration/Injection: R subacromial bursa  Date/Time: 2/18/2019 12:24 PM  Performed by: Maldonado Taylor MD  Authorized by: Maldonado Taylor MD     Consent Done?:  Yes (Verbal)  Indications:  Pain  Procedure site marked: Yes    Timeout: Prior to procedure the correct patient, procedure, and site was verified      Location:  Shoulder  Site:  R subacromial bursa  Prep: Patient was prepped and draped in usual sterile fashion    Ultrasonic Guidance for needle placement: Yes  Images are saved and documented.  Needle size:  25 G  Approach: Needle in plane, lateral to medial.  Medications:  6 mg betamethasone acetate-betamethasone sodium phosphate 6 mg/mL  Patient tolerance:  Patient tolerated the procedure well with no immediate complications    Additional Comments: Ultrasound guidance was used for correct needle placement, the images were saved will be uploaded to EMR.

## 2019-02-18 NOTE — LETTER
February 18, 2019      Aura Govea, APRN  [address] [facility] [address] [place] [address]           [facility]  [address] [place] [address]  [address]  [place] [address]  Phone: [phone]  Fax: [phone]          Patient: Warren Garcia   MR Number: [id number]   YOB: 1965   Date of Visit: 2/18/2019       Dear Aura Govea:    Thank you for referring Warren Garcia to me for evaluation. Attached you will find relevant portions of my assessment and plan of care.    If you have questions, please do not hesitate to call me. I look forward to following Warren Garcia along with you.    Sincerely,    Maldonado Taylor MD    Enclosure  CC:  No Recipients    If you would like to receive this communication electronically, please contact [email or web address] or [phone] to request more information on [employer] Link access.    For providers and/or their staff who would like to refer a patient to [facility], please contact us through our one-stop-shop provider referral line, [facility], at [phone].    If you feel you have received this communication in error or would no longer like to receive these types of communications, please e-mail [email or web address]

## 2019-04-22 DIAGNOSIS — R60.9 FLUID RETENTION: ICD-10-CM

## 2019-04-22 RX ORDER — FUROSEMIDE 20 MG/1
TABLET ORAL
Qty: 30 TABLET | Refills: 5 | Status: SHIPPED | OUTPATIENT
Start: 2019-04-22 | End: 2019-08-22 | Stop reason: SDUPTHER

## 2019-07-05 DIAGNOSIS — I10 ESSENTIAL HYPERTENSION: ICD-10-CM

## 2019-07-05 RX ORDER — LISINOPRIL 40 MG/1
TABLET ORAL
Qty: 90 TABLET | Refills: 3 | Status: SHIPPED | OUTPATIENT
Start: 2019-07-05 | End: 2019-08-22 | Stop reason: SDUPTHER

## 2019-08-10 DIAGNOSIS — N40.0 BENIGN PROSTATIC HYPERPLASIA, UNSPECIFIED WHETHER LOWER URINARY TRACT SYMPTOMS PRESENT: ICD-10-CM

## 2019-08-10 RX ORDER — TAMSULOSIN HYDROCHLORIDE 0.4 MG/1
CAPSULE ORAL
Qty: 30 CAPSULE | Refills: 11 | Status: SHIPPED | OUTPATIENT
Start: 2019-08-10 | End: 2019-08-22 | Stop reason: SDUPTHER

## 2019-08-22 ENCOUNTER — OFFICE VISIT (OUTPATIENT)
Dept: FAMILY MEDICINE | Facility: CLINIC | Age: 54
End: 2019-08-22
Payer: COMMERCIAL

## 2019-08-22 ENCOUNTER — OFFICE VISIT (OUTPATIENT)
Dept: PHYSICAL MEDICINE AND REHAB | Facility: CLINIC | Age: 54
End: 2019-08-22
Payer: COMMERCIAL

## 2019-08-22 VITALS
OXYGEN SATURATION: 96 % | DIASTOLIC BLOOD PRESSURE: 70 MMHG | HEIGHT: 66 IN | RESPIRATION RATE: 17 BRPM | SYSTOLIC BLOOD PRESSURE: 100 MMHG | WEIGHT: 241.19 LBS | HEART RATE: 64 BPM | BODY MASS INDEX: 38.76 KG/M2

## 2019-08-22 VITALS
HEART RATE: 64 BPM | WEIGHT: 241 LBS | HEIGHT: 66 IN | DIASTOLIC BLOOD PRESSURE: 70 MMHG | BODY MASS INDEX: 38.73 KG/M2 | SYSTOLIC BLOOD PRESSURE: 100 MMHG

## 2019-08-22 DIAGNOSIS — M65.331 TRIGGER FINGER, RIGHT MIDDLE FINGER: ICD-10-CM

## 2019-08-22 DIAGNOSIS — I25.10 CORONARY ARTERY DISEASE INVOLVING NATIVE CORONARY ARTERY OF NATIVE HEART WITHOUT ANGINA PECTORIS: Primary | Chronic | ICD-10-CM

## 2019-08-22 DIAGNOSIS — R60.9 FLUID RETENTION: ICD-10-CM

## 2019-08-22 DIAGNOSIS — E78.1 HYPERTRIGLYCERIDEMIA: Chronic | ICD-10-CM

## 2019-08-22 DIAGNOSIS — I10 ESSENTIAL HYPERTENSION: ICD-10-CM

## 2019-08-22 DIAGNOSIS — N40.0 BENIGN PROSTATIC HYPERPLASIA, UNSPECIFIED WHETHER LOWER URINARY TRACT SYMPTOMS PRESENT: ICD-10-CM

## 2019-08-22 DIAGNOSIS — E78.2 MIXED HYPERLIPIDEMIA: ICD-10-CM

## 2019-08-22 DIAGNOSIS — M79.641 PAIN OF RIGHT HAND: Primary | ICD-10-CM

## 2019-08-22 DIAGNOSIS — I25.10 CORONARY ARTERY DISEASE WITHOUT ANGINA PECTORIS, UNSPECIFIED VESSEL OR LESION TYPE, UNSPECIFIED WHETHER NATIVE OR TRANSPLANTED HEART: ICD-10-CM

## 2019-08-22 PROCEDURE — 99213 OFFICE O/P EST LOW 20 MIN: CPT | Mod: 25,S$GLB,, | Performed by: EMERGENCY MEDICINE

## 2019-08-22 PROCEDURE — 26055 PR INCISE FINGER TENDON SHEATH: ICD-10-PCS | Mod: S$GLB,,, | Performed by: PHYSICAL MEDICINE & REHABILITATION

## 2019-08-22 PROCEDURE — 76942 ECHO GUIDE FOR BIOPSY: CPT | Mod: 26,,, | Performed by: PHYSICAL MEDICINE & REHABILITATION

## 2019-08-22 PROCEDURE — 76942 PR U/S GUIDANCE FOR NEEDLE GUIDANCE: ICD-10-PCS | Mod: 26,,, | Performed by: PHYSICAL MEDICINE & REHABILITATION

## 2019-08-22 PROCEDURE — 26055 INCISE FINGER TENDON SHEATH: CPT | Mod: S$GLB,,, | Performed by: PHYSICAL MEDICINE & REHABILITATION

## 2019-08-22 PROCEDURE — 90471 FLU VACCINE (QUAD) GREATER THAN OR EQUAL TO 3YO PRESERVATIVE FREE IM: ICD-10-PCS | Mod: S$GLB,,, | Performed by: EMERGENCY MEDICINE

## 2019-08-22 PROCEDURE — 99999 PR PBB SHADOW E&M-EST. PATIENT-LVL III: CPT | Mod: PBBFAC,,, | Performed by: EMERGENCY MEDICINE

## 2019-08-22 PROCEDURE — 90471 IMMUNIZATION ADMIN: CPT | Mod: S$GLB,,, | Performed by: EMERGENCY MEDICINE

## 2019-08-22 PROCEDURE — 99213 OFFICE O/P EST LOW 20 MIN: CPT | Mod: 25,S$GLB,, | Performed by: PHYSICAL MEDICINE & REHABILITATION

## 2019-08-22 PROCEDURE — 99213 PR OFFICE/OUTPT VISIT, EST, LEVL III, 20-29 MIN: ICD-10-PCS | Mod: 25,S$GLB,, | Performed by: EMERGENCY MEDICINE

## 2019-08-22 PROCEDURE — 90686 FLU VACCINE (QUAD) GREATER THAN OR EQUAL TO 3YO PRESERVATIVE FREE IM: ICD-10-PCS | Mod: S$GLB,,, | Performed by: EMERGENCY MEDICINE

## 2019-08-22 PROCEDURE — 99999 PR PBB SHADOW E&M-EST. PATIENT-LVL III: CPT | Mod: PBBFAC,,, | Performed by: PHYSICAL MEDICINE & REHABILITATION

## 2019-08-22 PROCEDURE — 90686 IIV4 VACC NO PRSV 0.5 ML IM: CPT | Mod: S$GLB,,, | Performed by: EMERGENCY MEDICINE

## 2019-08-22 PROCEDURE — 99999 PR PBB SHADOW E&M-EST. PATIENT-LVL III: ICD-10-PCS | Mod: PBBFAC,,, | Performed by: EMERGENCY MEDICINE

## 2019-08-22 PROCEDURE — 99999 PR PBB SHADOW E&M-EST. PATIENT-LVL III: ICD-10-PCS | Mod: PBBFAC,,, | Performed by: PHYSICAL MEDICINE & REHABILITATION

## 2019-08-22 PROCEDURE — 99213 PR OFFICE/OUTPT VISIT, EST, LEVL III, 20-29 MIN: ICD-10-PCS | Mod: 25,S$GLB,, | Performed by: PHYSICAL MEDICINE & REHABILITATION

## 2019-08-22 RX ORDER — LISINOPRIL 40 MG/1
40 TABLET ORAL DAILY
Qty: 90 TABLET | Refills: 3 | Status: SHIPPED | OUTPATIENT
Start: 2019-08-22

## 2019-08-22 RX ORDER — CLOPIDOGREL BISULFATE 75 MG/1
75 TABLET ORAL DAILY
Qty: 90 TABLET | Refills: 3 | Status: SHIPPED | OUTPATIENT
Start: 2019-08-22 | End: 2020-08-21

## 2019-08-22 RX ORDER — METOPROLOL TARTRATE 25 MG/1
25 TABLET, FILM COATED ORAL 2 TIMES DAILY
Qty: 540 TABLET | Refills: 3 | Status: SHIPPED | OUTPATIENT
Start: 2019-08-22

## 2019-08-22 RX ORDER — ATORVASTATIN CALCIUM 80 MG/1
80 TABLET, FILM COATED ORAL DAILY
Qty: 90 TABLET | Refills: 3 | Status: SHIPPED | OUTPATIENT
Start: 2019-08-22

## 2019-08-22 RX ORDER — AMLODIPINE BESYLATE 5 MG/1
5 TABLET ORAL DAILY
Qty: 90 TABLET | Refills: 3 | Status: SHIPPED | OUTPATIENT
Start: 2019-08-22

## 2019-08-22 RX ORDER — FUROSEMIDE 20 MG/1
20 TABLET ORAL DAILY
Qty: 90 TABLET | Refills: 3 | Status: SHIPPED | OUTPATIENT
Start: 2019-08-22

## 2019-08-22 RX ORDER — TAMSULOSIN HYDROCHLORIDE 0.4 MG/1
1 CAPSULE ORAL DAILY
Qty: 90 CAPSULE | Refills: 3 | Status: SHIPPED | OUTPATIENT
Start: 2019-08-22

## 2019-08-22 NOTE — PROGRESS NOTES
"[facility]    CHIEF COMPLAINT:   Chief Complaint   Patient presents with    Hand Pain     trigger finger     HISTORY OF PRESENT ILLNESS: Warren Garcia is a 54 y.o. male who presents to me for evaluation and treatment of a right middle finger trigger finger. He was last seen by me in Feb 2019, at which time he received a right subacromial bursa steroid injection.    Today, he reports right middle finger trigger finger which has been present for years. His finger gets stuck and he has to use the left hand to extend it. It is worse at night and when he wakes up in the morning. The finger improves once it gets "warmed up." He has had injections 2-3 times, most recently 2 years ago, and he reports that he gets 1.5-2 years of relief each time. He has never had any hand surgery. He did fracture the right 4th metacarpal years ago but didn't need surgery. He is interested in an injection today. Denies recent infections, antibiotic use, or DM (used to have DM but it resolved with weight loss/diet). Takes ASA and Plavix.    Review of Systems   Constitutional: Negative for fever.   HENT: Negative for drooling.    Eyes: Negative for discharge.   Respiratory: Negative for choking.    Cardiovascular: Negative for chest pain.   Genitourinary: Negative for flank pain.   Skin: Negative for wound.   Allergic/Immunologic: Negative for immunocompromised state.   Neurological: Negative for tremors and syncope.   Psychiatric/Behavioral: Negative for behavioral problems.     Past Medical History:   Past Medical History:   Diagnosis Date    Hyperlipidemia     Hypertension        Past Surgical History:   Past Surgical History:   Procedure Laterality Date    HERNIA REPAIR      bilateral infant       Family History:   Family History   Problem Relation Age of Onset    Cancer Father         skin    Stroke Mother     Hypertension Brother     Hypertension Brother     Hypertension Son        Medications:   Current " Outpatient Medications on File Prior to Visit   Medication Sig Dispense Refill    amLODIPine (NORVASC) 5 MG tablet Take 1 tablet (5 mg total) by mouth once daily. 90 tablet 3    aspirin (ECOTRIN) 81 MG EC tablet Take 81 mg by mouth daily      atorvastatin (LIPITOR) 80 MG tablet Take 1 tablet (80 mg total) by mouth once daily. 90 tablet 3    clopidogrel (PLAVIX) 75 mg tablet Take 1 tablet (75 mg total) by mouth once daily. 90 tablet 3    furosemide (LASIX) 20 MG tablet Take 1 tablet (20 mg total) by mouth once daily. 90 tablet 3    lisinopril (PRINIVIL,ZESTRIL) 40 MG tablet Take 1 tablet (40 mg total) by mouth once daily. 90 tablet 3    metoprolol tartrate (LOPRESSOR) 25 MG tablet Take 1 tablet (25 mg total) by mouth 2 (two) times daily. 540 tablet 3    tamsulosin (FLOMAX) 0.4 mg Cap Take 1 capsule (0.4 mg total) by mouth once daily. 90 capsule 3    [DISCONTINUED] amLODIPine (NORVASC) 5 MG tablet TAKE 1 TABLET BY MOUTH ONCE DAILY 90 tablet 3    [DISCONTINUED] atorvastatin (LIPITOR) 80 MG tablet TAKE 1 TABLET BY MOUTH ONCE DAILY 90 tablet 3    [DISCONTINUED] betamethasone valerate 0.1% (VALISONE) 0.1 % Lotn Apply topically 2 (two) times daily. for 14 days 60 mL 1    [DISCONTINUED] BRILINTA 90 mg tablet TAKE 1 TABLET BY MOUTH TWICE DAILY 180 tablet 3    [DISCONTINUED] furosemide (LASIX) 20 MG tablet TAKE ONE TABLET BY MOUTH ONCE DAILY AS NEEDED 30 tablet 5    [DISCONTINUED] lisinopril (PRINIVIL,ZESTRIL) 40 MG tablet TAKE 1 TABLET BY MOUTH ONCE DAILY 90 tablet 3    [DISCONTINUED] metoprolol tartrate (LOPRESSOR) 25 MG tablet TAKE 1 TABLET BY MOUTH TWICE DAILY 180 tablet 3    [DISCONTINUED] tamsulosin (FLOMAX) 0.4 mg Cap TAKE 1 CAPSULE BY MOUTH ONCE DAILY 30 capsule 11     No current facility-administered medications on file prior to visit.        Allergies:   Review of patient's allergies indicates:   Allergen Reactions    Soma [carisoprodol]        Social History:   Social History     Socioeconomic  "History    Marital status:      Spouse name: Not on file    Number of children: 3    Years of education: Not on file    Highest education level: Not on file   Occupational History    Occupation: self employed     Employer: [employer]   Social Needs    Financial resource strain: Not hard at all    Food insecurity:     Worry: Never true     Inability: Never true    Transportation needs:     Medical: No     Non-medical: No   Tobacco Use    Smoking status: Never Smoker    Smokeless tobacco: Never Used   Substance and Sexual Activity    Alcohol use: No     Frequency: Never     Drinks per session: Patient refused     Binge frequency: Never    Drug use: No    Sexual activity: Yes     Partners: Female   Lifestyle    Physical activity:     Days per week: 4 days     Minutes per session: Not on file    Stress: Only a little   Relationships    Social connections:     Talks on phone: More than three times a week     Gets together: Three times a week     Attends [employer] service: Not on file     Active member of club or organization: Yes     Attends meetings of clubs or organizations: More than 4 times per year     Relationship status:    Other Topics Concern    Not on file   Social History Narrative    Minimal exercise       PHYSICAL EXAMINATION:   General    Vitals:    08/22/19 1406   BP: 100/70   Pulse: 64   Weight: 109.3 kg (241 lb)   Height: 5' 6" (1.676 m)     Constitutional: Oriented to person, place, and time. No apparent distress. Pleasant.  HENT:   Head: Normocephalic and atraumatic.   Eyes: Right eye exhibits no discharge. Left eye exhibits no discharge. No scleral icterus.   Pulmonary/Chest: Effort normal. No respiratory distress.   Abdominal: There is no guarding.   Neurological: Alert and oriented to person, place, and time.   Psychiatric: Behavior is normal.   Right Hand Exam     Tenderness   Right hand tenderness location: proximal to volar aspect of 3rd MCP.    Range of Motion "   The patient has normal right wrist ROM.     Muscle Strength   The patient has normal right wrist strength.  Wrist extension: 5/5   Wrist flexion: 5/5   : 5/5     Other   Erythema: absent  Scars: absent  Sensation: normal    Comments:  Triggering of right middle finger with flexion      Left Hand Exam   Left hand exam is normal.    Range of Motion   The patient has normal left wrist ROM.    Muscle Strength   The patient has normal left wrist strength.  Wrist extension: 5/5   Wrist flexion: 5/5   :  5/5     Other   Erythema: absent  Scars: absent  Sensation: normal        INSPECTION: There is no swelling, ecchymoses, erythema or gross deformity of the right hand.    Imaging  X-ray of the right hand from 8/20/18:   FINDINGS:  Interphalangeal joint space loss is noted diffusely.  Radiocarpal joint space loss is noted suggesting degenerative change.  Calcifications are noted laterally along the distal interphalangeal joint of the 4th digit 2 in number of approximately 3 mm the that may relate to prior ligamentous or tendinous injury that appear well corticated and remote.  Calcific bursitis or tendonitis could appear similar.  No acute fractures or dislocations are noted.        Data Reviewed: X-ray    Supportive Actions: Independent visualization of images or test specimens    ASSESSMENT:   1. Pain of right hand    2. Trigger finger, right middle finger      PLAN:     1. Time was spent reviewing the above diagnosis in depth with Warren Quick today, including acute management and rehabilitation.     2.  He has signs and symptoms consistent with a diagnosis of trigger finger of the right middle finger.  He has tried prior injections of corticosteroid with temporary results.  We discussed percutaneous ultrasound-guided trigger finger release in the hopes of permanent symptom resolution, eliminating the need for further injections of corticosteroid or open surgery.  We discussed risks and benefits of trigger  finger treatment options including corticosteroid injection, percutaneous release, and surgery.  We discussed this procedure to not guarantee complete or permanent symptom resolution or the need for further interventions.  We discussed potential risks including infection and tendon rupture.  The patient elected to proceed with percutaneous release under ultrasound guidance. See separate procedure note.     3.  Encouraged him to avoid heavy lifting for the next few weeks.  We discussed the likelihood of increased soreness of the area for the next few days.  He may use ice and over-the-counter pain medicines as needed.  I encouraged him to keep the area clean and do not submerge under water for 4-5 days.    4.  He is moving to [place] in the next few weeks.  We will contact him by phone in the next 1 month to assess his progress.  He was encouraged to contact our office if any questions or issues arise.    Procedure Note     Pre-op Diagnosis:  Trigger finger of the right 3rd digit     Post-op Diagnosis: Post-Op Diagnosis Codes:     Trigger finger of the right 3rd digit     Procedure(s) (LRB):  1.  Percutaneous trigger finger release to the right 3rd digit:  2.  Ultrasound guidance.     Anesthesia:  Local     Procedure in Detail/Findings:     Description of Procedure:  Ultrasound-guided percutaneous A1 pulley release  Performed by: Maldonado Taylor MD  Authorized by: Maldonado Taylor MD      Consent Done?:  Yes (Verbal)  Indications:  Pain  Site marked: The procedure site was marked    Timeout: Prior to procedure the correct patient, procedure, and site was verified       Location: right 3rd A1 pulley.  Prep: Patient was prepped and draped in usual sterile fashion    Ultrasonic Guidance for needle placement: Yes  Images are saved and documented.  Needle size:  18 G (Filter needle)  Approach: Needle in plane, distal to proximal.  Medications:  None  Patient tolerance:  Patient tolerated the procedure well with  no immediate complications  Procedure: Ultrasound guidance was used for correct needle placement, the images were saved will be uploaded to EMR. The filter needle was guided under ultrasound guidance to the right 3rd A1 pulley, using an in plane, distal to proximal approach.  Care was taken not to advance the tip of the filter needle into the underlying finger flexor tendons.  The filter needle tip was used to fenestrate the A1 pulley.  This was done until a loss of resistance was felt.  The needle was then removed and the area was cleaned and bandage applied.    The above note was completed, in part, with the aid of Dragon dictation software/hardware. Translation errors may be present.

## 2019-08-22 NOTE — PROGRESS NOTES
Subjective:   THIS NOTE IS DONE WITH VOICE RECOGNITION        Patient ID: Warren Garcia is a 54 y.o. male.    Chief Complaint: Hypertension      HPI     Here to follow up on blood pressure.  Taking current medications.  His blood pressure has been well controlled.  He is not experiencing any symptoms with this low blood pressure that currently has.    BP Readings from Last 3 Encounters:   08/22/19 100/70   08/22/19 100/70   02/18/19 125/81     He does have coronary artery disease that required intervention.  Since his intervention, he has been stable.  He has had no recurrent chest pain.  He has been able lose some weight.  He does continue on anti-platelet therapy, but is finding it difficult secondary finances to continue this.    Last echocardiogram September 2017:    TEST DESCRIPTION   Technical Quality: This is a technically adequate study.     Aorta: The aortic root is normal in size, measuring 3.4 cm at sinotubular junction and 3.6 cm at Sinuses of Valsalva. The proximal ascending aorta is normal in size, measuring 3.5 cm across.     Left Atrium: The left atrial volume index is severely enlarged, measuring 48.63 cc/m2.     Left Ventricle: The left ventricle is normal in size, with an end-diastolic diameter of 4.6 cm, and an end-systolic diameter of 3.5 cm. LV wall thickness is normal, with the septum measuring 0.9 cm and the posterior wall measuring 1.0 cm across. Relative   wall thickness was normal at 0.43, and the LV mass index was 79.8 g/m2 consistent with normal left ventricular mass. There are no regional wall motion abnormalities. Left ventricular systolic function appears low normal to mildly depressed. Visually   estimated ejection fraction is 50-55%. The LV Doppler derived stroke volume equals 115.0 ccs.     Diastolic indices: E wave velocity 1.0 m/s, E/A ratio 1.1,  msec., E/e' ratio(avg) 17. There is pseudonormalization of mitral inflow pattern consistent with significant diastolic  dysfunction.     Right Atrium: The right atrium is normal in size, measuring 5.1 cm in length and 2.8 cm in width in the apical view.     Right Ventricle: The right ventricle is normal in size measuring 3.6 cm at the base in the apical right ventricle-focused view. Global right ventricular systolic function appears normal. Tricuspid annular plane systolic excursion (TAPSE) is 2.1 cm.     Aortic Valve:  The aortic valve is normal in structure.     Mitral Valve:  The mitral valve is normal in structure. There is trivial mitral regurgitation.     Tricuspid Valve:  The tricuspid valve is normal in structure. There is trivial tricuspid regurgitation.     IVC: IVC is normal in size and collapses > 50% with a sniff, suggesting normal right atrial pressure of 3 mmHg.     Intracavitary: There is no evidence of pericardial effusion, intracavity mass, thrombi, or vegetation.     This document has been electronically    SIGNED BY: Jesse Abreu MD On: 09/25/2017 12:55    He has had recent weight gain.  He attributes this to the challenges of his job being terminated, relocating to [place], and all the other lifestyle changes that this is causing.  He has purchased a home near [facility].  He is not going to continue in the [employer] business.  He does have some work lined, but not a full-time job.  He is investing a significant amount of time in money in his new home, and does have a plan to make this transition.  With all of this, it remains stressful.    I have recommended that he establish healthcare in his local community, particularly around his cardiovascular issues.    Colon cancer screening has been requested the form of fecal immune testing.  He is reminded this should be an annual event.  His fecal immune testing in 2018 was negative.    Hyperlipidemia monitoring.  Atorvastatin 80 milligrams, tolerated without significant side effect.    Lab Results   Component Value Date    CHOL 152 09/20/2018    CHOL 135  09/14/2017    CHOL 149 03/30/2016     Lab Results   Component Value Date    HDL 60 09/20/2018    HDL 47 09/14/2017    HDL 39 (L) 03/30/2016     Lab Results   Component Value Date    LDLCALC 69.2 09/20/2018    LDLCALC 58.4 (L) 09/14/2017    LDLCALC 89.2 03/30/2016     Lab Results   Component Value Date    TRIG 114 09/20/2018    TRIG 148 09/14/2017    TRIG 104 03/30/2016     Lab Results   Component Value Date    CHOLHDL 39.5 09/20/2018    CHOLHDL 34.8 09/14/2017    CHOLHDL 26.2 03/30/2016     He is using tamsulosin for obstructive symptoms.  He has been using this with success.  Reports no changes.  He plans to continue this medication.  There is no family history of prostate cancer.    PSA, SCREEN (ng/mL)   Date Value   09/20/2018 0.45       Immunization History   Administered Date(s) Administered    Influenza - Quadrivalent 01/22/2016    Influenza - Quadrivalent - PF (6 months and older) 09/19/2017, 09/20/2018, 08/22/2019    Influenza Split 10/26/2010    Tdap 03/06/2015     Recommended pneumococcal vaccines, and recombinant shingles vaccines, at his convenience.    As noted above, his social history is changed dramatically with this job determination and relocation to [place].  No change in surgical or past medical history.  No new additions in regards to family history.    Current Outpatient Medications   Medication Sig Dispense Refill    amLODIPine (NORVASC) 5 MG tablet TAKE 1 TABLET BY MOUTH ONCE DAILY 90 tablet 3    aspirin (ECOTRIN) 81 MG EC tablet Take 81 mg by mouth daily      atorvastatin (LIPITOR) 80 MG tablet TAKE 1 TABLET BY MOUTH ONCE DAILY 90 tablet 3    BRILINTA 90 mg tablet TAKE 1 TABLET BY MOUTH TWICE DAILY 180 tablet 3    furosemide (LASIX) 20 MG tablet TAKE ONE TABLET BY MOUTH ONCE DAILY AS NEEDED 30 tablet 5    lisinopril (PRINIVIL,ZESTRIL) 40 MG tablet TAKE 1 TABLET BY MOUTH ONCE DAILY 90 tablet 3    metoprolol tartrate (LOPRESSOR) 25 MG tablet TAKE 1 TABLET BY MOUTH TWICE DAILY 180  tablet 3    tamsulosin (FLOMAX) 0.4 mg Cap TAKE 1 CAPSULE BY MOUTH ONCE DAILY 30 capsule 11     No current facility-administered medications for this visit.      Patient entered via patient portal and augmented by myself.    Review of Systems   Constitutional: Negative for activity change and unexpected weight change.   HENT: Negative for hearing loss, rhinorrhea and trouble swallowing.    Eyes: Negative for discharge and visual disturbance.   Respiratory: Negative for chest tightness and wheezing.    Cardiovascular: Negative for chest pain and palpitations.   Gastrointestinal: Negative for blood in stool, constipation, diarrhea and vomiting.   Endocrine: Negative for polydipsia and polyuria.   Genitourinary: Negative for difficulty urinating, hematuria and urgency.   Musculoskeletal: Negative for arthralgias, joint swelling and neck pain.   Neurological: Negative for weakness and headaches.   Psychiatric/Behavioral: Negative for confusion and dysphoric mood.       Objective:      Physical Exam   Constitutional: He is oriented to person, place, and time. He appears well-developed and well-nourished. No distress.   HENT:   Head: Normocephalic and atraumatic.   Right Ear: External ear normal.   Left Ear: External ear normal.   Nose: Nose normal.   Mouth/Throat: Oropharynx is clear and moist. No oropharyngeal exudate.   Eyes: Pupils are equal, round, and reactive to light. Conjunctivae and EOM are normal. No scleral icterus.   Neck: Normal range of motion. Neck supple. No thyromegaly present.   Cardiovascular: Normal rate, regular rhythm and normal heart sounds. PMI is not displaced.   No murmur heard.  Pulses:       Carotid pulses are 2+ on the right side, and 2+ on the left side.       Radial pulses are 2+ on the right side, and 2+ on the left side.        Femoral pulses are 2+ on the right side, and 2+ on the left side.       Popliteal pulses are 2+ on the right side, and 2+ on the left side.        Dorsalis pedis  pulses are 2+ on the right side, and 2+ on the left side.        Posterior tibial pulses are 2+ on the right side, and 2+ on the left side.   Pulmonary/Chest: Effort normal and breath sounds normal. He has no wheezes. He has no rales.   Abdominal: Soft. Bowel sounds are normal. He exhibits no distension. There is no tenderness.   Musculoskeletal: Normal range of motion. He exhibits no edema or tenderness.   Lymphadenopathy:     He has no cervical adenopathy.   Neurological: He is alert and oriented to person, place, and time. He has normal reflexes. He exhibits normal muscle tone. Coordination normal.   Skin: Skin is warm and dry. No rash noted.   Psychiatric: He has a normal mood and affect. His behavior is normal.   Vitals reviewed.      Assessment:       1. Coronary artery disease involving native coronary artery of native heart without angina pectoris    2. Benign prostatic hyperplasia, unspecified whether lower urinary tract symptoms present    3. Coronary artery disease without angina pectoris, unspecified vessel or lesion type, unspecified whether native or transplanted heart    4. Essential hypertension    5. Fluid retention    6. Mixed hyperlipidemia    7. BMI 38.0-38.9,adult    8. Hypertriglyceridemia        Plan:       1. Benign prostatic hyperplasia, unspecified whether lower urinary tract symptoms present  Stable  Continue current medications    - tamsulosin (FLOMAX) 0.4 mg Cap; Take 1 capsule (0.4 mg total) by mouth once daily.  Dispense: 90 capsule; Refill: 3    2. Coronary artery disease without angina pectoris, unspecified vessel or lesion type, unspecified whether native or transplanted heart  Stable  Update labs  Will discontinue Brilinta secondary to cost and switch to Plavix  Continue other medications    - Comprehensive metabolic panel; Future  - Lipid panel; Future  - clopidogrel (PLAVIX) 75 mg tablet; Take 1 tablet (75 mg total) by mouth once daily.  Dispense: 90 tablet; Refill: 3  -  metoprolol tartrate (LOPRESSOR) 25 MG tablet; Take 1 tablet (25 mg total) by mouth 2 (two) times daily.  Dispense: 540 tablet; Refill: 3    3. Essential hypertension  Controlled.  Continue current medications  Continue to avoid excessive sodium  Continue home monitoring  Target blood pressure is 139/89 or less    - metoprolol tartrate (LOPRESSOR) 25 MG tablet; Take 1 tablet (25 mg total) by mouth 2 (two) times daily.  Dispense: 540 tablet; Refill: 3  - lisinopril (PRINIVIL,ZESTRIL) 40 MG tablet; Take 1 tablet (40 mg total) by mouth once daily.  Dispense: 90 tablet; Refill: 3  - amLODIPine (NORVASC) 5 MG tablet; Take 1 tablet (5 mg total) by mouth once daily.  Dispense: 90 tablet; Refill: 3    4. Fluid retention  This was a predominant symptom following his myocardial infarction.  He currently uses furosemide for fluid as needed.  He is basing this on daily weights.    - furosemide (LASIX) 20 MG tablet; Take 1 tablet (20 mg total) by mouth once daily.  Dispense: 90 tablet; Refill: 3    5. Mixed hyperlipidemia  Lipids controlled  Continue current medications  Annual lipid profile and comprehensive metabolic panel  If complications developed such as myalgia or arthralgia, contact me.  - atorvastatin (LIPITOR) 80 MG tablet; Take 1 tablet (80 mg total) by mouth once daily.  Dispense: 90 tablet; Refill: 3    6. Coronary artery disease involving native coronary artery of native heart without angina pectoris  See above comments    7. BMI 38.0-38.9,adult  Some recent deterioration of weight control  Almost certainly related to the behavioral challenges given his loss a job and his anticipated relocation  He is very aware of the importance of controlling his weight in regards cardiovascular disease.    8. Hypertriglyceridemia  Lipids controlled  Continue current medications  Annual lipid profile and comprehensive metabolic panel  If complications developed such as myalgia or arthralgia, contact me.    All medications are  renewed.  Encouraged to establish care with local physician in [place], ideally with a physician who has access to Epic  I would recommend continuation of anti-platelet therapy as well as aspirin.

## 2019-08-25 PROBLEM — M25.511 ACUTE PAIN OF RIGHT SHOULDER: Status: RESOLVED | Noted: 2017-08-31 | Resolved: 2019-08-25

## 2019-08-25 NOTE — PATIENT INSTRUCTIONS
Warren,    I wish you well with your new adventures.  It sounds like you have done a tremendous job preparing for this.    I do recommend that you get a local physician to address your cardiovascular issues, and be a source for your ongoing medication management    I also recommend continue weight loss.    I would recommend completing your vaccines which include 13 Valiant pneumococcal vaccine, 23 Valent pneumococcal vaccine, influenza vaccine, and recombinant shingles vaccine.    If you have any problems making the transition, please let me know.  Your patient portal should continue to work for you.    Warren Simeon MD

## 2019-08-26 ENCOUNTER — TELEPHONE (OUTPATIENT)
Dept: PHYSICAL MEDICINE AND REHAB | Facility: CLINIC | Age: 54
End: 2019-08-26

## 2019-08-26 ENCOUNTER — LAB VISIT (OUTPATIENT)
Dept: LAB | Facility: HOSPITAL | Age: 54
End: 2019-08-26
Attending: EMERGENCY MEDICINE
Payer: COMMERCIAL

## 2019-08-26 ENCOUNTER — PATIENT MESSAGE (OUTPATIENT)
Dept: PHYSICAL MEDICINE AND REHAB | Facility: CLINIC | Age: 54
End: 2019-08-26

## 2019-08-26 DIAGNOSIS — I25.10 CORONARY ARTERY DISEASE WITHOUT ANGINA PECTORIS, UNSPECIFIED VESSEL OR LESION TYPE, UNSPECIFIED WHETHER NATIVE OR TRANSPLANTED HEART: ICD-10-CM

## 2019-08-26 LAB
ALBUMIN SERPL BCP-MCNC: 4.5 G/DL (ref 3.5–5.2)
ALP SERPL-CCNC: 75 U/L (ref 55–135)
ALT SERPL W/O P-5'-P-CCNC: 37 U/L (ref 10–44)
ANION GAP SERPL CALC-SCNC: 9 MMOL/L (ref 8–16)
AST SERPL-CCNC: 20 U/L (ref 10–40)
BILIRUB SERPL-MCNC: 0.9 MG/DL (ref 0.1–1)
BUN SERPL-MCNC: 23 MG/DL (ref 6–20)
CALCIUM SERPL-MCNC: 9.5 MG/DL (ref 8.7–10.5)
CHLORIDE SERPL-SCNC: 107 MMOL/L (ref 95–110)
CHOLEST SERPL-MCNC: 121 MG/DL (ref 120–199)
CHOLEST/HDLC SERPL: 2.5 {RATIO} (ref 2–5)
CO2 SERPL-SCNC: 25 MMOL/L (ref 23–29)
CREAT SERPL-MCNC: 0.9 MG/DL (ref 0.5–1.4)
EST. GFR  (AFRICAN AMERICAN): >60 ML/MIN/1.73 M^2
EST. GFR  (NON AFRICAN AMERICAN): >60 ML/MIN/1.73 M^2
GLUCOSE SERPL-MCNC: 101 MG/DL (ref 70–110)
HDLC SERPL-MCNC: 49 MG/DL (ref 40–75)
HDLC SERPL: 40.5 % (ref 20–50)
LDLC SERPL CALC-MCNC: 41.4 MG/DL (ref 63–159)
NONHDLC SERPL-MCNC: 72 MG/DL
POTASSIUM SERPL-SCNC: 4.1 MMOL/L (ref 3.5–5.1)
PROT SERPL-MCNC: 7.3 G/DL (ref 6–8.4)
SODIUM SERPL-SCNC: 141 MMOL/L (ref 136–145)
TRIGL SERPL-MCNC: 153 MG/DL (ref 30–150)

## 2019-08-26 PROCEDURE — 36415 COLL VENOUS BLD VENIPUNCTURE: CPT | Mod: PO

## 2019-08-26 PROCEDURE — 80061 LIPID PANEL: CPT

## 2019-08-26 PROCEDURE — 80053 COMPREHEN METABOLIC PANEL: CPT

## 2019-08-26 NOTE — TELEPHONE ENCOUNTER
----- Message from Maldonado Taylor MD sent at 8/22/2019  4:57 PM CDT -----  Please give him a call to see how he has been progressing since the trigger finger release.

## 2019-09-19 ENCOUNTER — PATIENT MESSAGE (OUTPATIENT)
Dept: PHYSICAL MEDICINE AND REHAB | Facility: CLINIC | Age: 54
End: 2019-09-19

## 2019-10-07 DIAGNOSIS — Z12.11 COLON CANCER SCREENING: ICD-10-CM

## 2020-05-06 ENCOUNTER — PATIENT MESSAGE (OUTPATIENT)
Dept: ADMINISTRATIVE | Facility: HOSPITAL | Age: 55
End: 2020-05-06

## 2020-10-05 ENCOUNTER — PATIENT MESSAGE (OUTPATIENT)
Dept: ADMINISTRATIVE | Facility: HOSPITAL | Age: 55
End: 2020-10-05

## 2020-10-09 DIAGNOSIS — Z12.11 COLON CANCER SCREENING: ICD-10-CM

## 2021-01-04 ENCOUNTER — PATIENT MESSAGE (OUTPATIENT)
Dept: ADMINISTRATIVE | Facility: HOSPITAL | Age: 56
End: 2021-01-04

## 2021-04-06 ENCOUNTER — PATIENT MESSAGE (OUTPATIENT)
Dept: ADMINISTRATIVE | Facility: HOSPITAL | Age: 56
End: 2021-04-06